# Patient Record
Sex: MALE | Race: WHITE | Employment: UNEMPLOYED | URBAN - METROPOLITAN AREA
[De-identification: names, ages, dates, MRNs, and addresses within clinical notes are randomized per-mention and may not be internally consistent; named-entity substitution may affect disease eponyms.]

---

## 2019-01-15 ENCOUNTER — TELEPHONE (OUTPATIENT)
Dept: FAMILY MEDICINE CLINIC | Facility: CLINIC | Age: 49
End: 2019-01-15

## 2019-01-16 ENCOUNTER — OFFICE VISIT (OUTPATIENT)
Dept: FAMILY MEDICINE CLINIC | Facility: CLINIC | Age: 49
End: 2019-01-16
Payer: COMMERCIAL

## 2019-01-16 VITALS
DIASTOLIC BLOOD PRESSURE: 70 MMHG | SYSTOLIC BLOOD PRESSURE: 128 MMHG | HEIGHT: 69.5 IN | RESPIRATION RATE: 16 BRPM | WEIGHT: 210 LBS | TEMPERATURE: 99 F | BODY MASS INDEX: 30.4 KG/M2 | HEART RATE: 84 BPM

## 2019-01-16 DIAGNOSIS — Z00.00 ANNUAL PHYSICAL EXAM: Primary | ICD-10-CM

## 2019-01-16 DIAGNOSIS — B07.9 VIRAL WARTS, UNSPECIFIED TYPE: ICD-10-CM

## 2019-01-16 DIAGNOSIS — Z12.11 COLON CANCER SCREENING: ICD-10-CM

## 2019-01-16 PROCEDURE — 99386 PREV VISIT NEW AGE 40-64: CPT | Performed by: FAMILY MEDICINE

## 2019-01-16 NOTE — PROGRESS NOTES
Patient presents with:  Physical: new patient first visit, no labs, not fasting, no chronic health hx, no rx     HPI:   Jackie Johnson is a 51year old male who presents for a complete physical exam. He is a new patient.       Sinus - his wife says he s °C) (Oral)   Resp 16   Ht 69.5\"   Wt 210 lb   BMI 30.57 kg/m²   Body mass index is 30.57 kg/m². General appearance: alert, appears stated age and cooperative  Eyes: conjunctivae/corneas clear. PERRL, EOM's intact.    Ears: normal TM's and external ear

## 2019-10-09 ENCOUNTER — OFFICE VISIT (OUTPATIENT)
Dept: FAMILY MEDICINE CLINIC | Facility: CLINIC | Age: 49
End: 2019-10-09
Payer: COMMERCIAL

## 2019-10-09 VITALS
WEIGHT: 211.81 LBS | HEART RATE: 89 BPM | HEIGHT: 69.5 IN | BODY MASS INDEX: 30.67 KG/M2 | TEMPERATURE: 99 F | DIASTOLIC BLOOD PRESSURE: 74 MMHG | RESPIRATION RATE: 18 BRPM | SYSTOLIC BLOOD PRESSURE: 122 MMHG

## 2019-10-09 DIAGNOSIS — J01.00 ACUTE NON-RECURRENT MAXILLARY SINUSITIS: Primary | ICD-10-CM

## 2019-10-09 PROCEDURE — 99213 OFFICE O/P EST LOW 20 MIN: CPT | Performed by: NURSE PRACTITIONER

## 2019-10-09 RX ORDER — AMOXICILLIN AND CLAVULANATE POTASSIUM 875; 125 MG/1; MG/1
1 TABLET, FILM COATED ORAL 2 TIMES DAILY
Qty: 20 TABLET | Refills: 0 | Status: SHIPPED | OUTPATIENT
Start: 2019-10-09 | End: 2019-10-19

## 2019-10-09 NOTE — PROGRESS NOTES
Patient presents with:  Sinus Problem: X 2 WEEK, PRESSURE , YELLOW MUCUS, SUDAFED AM , BENADRYL PM      HPI:  Presents with 2 week history of sinus congestion, mild sore throat, cough with production of yellow colored sputum, fatigue, nasal drainage and ea respiratory distress. Effort normal. Breath sounds clear bilaterally. No wheezes, rhonchi or rales appreciated. No cough heard during exam.    Skin: Skin is warm and dry. No rash noted. No erythema. No pallor.        A/P:    Acute non-recurrent maxillary si variety over the \"teapot\" style, as it allows for better cleansing.      If you use the full sinus rinse device, as above, you may also still use the Ocean Nasal spray (or generic equivalent) during the day while at work, school or in between full sinus w

## 2019-10-09 NOTE — PATIENT INSTRUCTIONS
Gargle with warm salt water solution 3-5 times daily. Dissolve 1/2 teaspoon salt in half cup of warm tap water. Gargle and spit.      Try a premixed saline nasal spray, available over the counter, such as Smith Nasal Spray (or generic equi

## 2020-03-11 ENCOUNTER — OFFICE VISIT (OUTPATIENT)
Dept: FAMILY MEDICINE CLINIC | Facility: CLINIC | Age: 50
End: 2020-03-11
Payer: COMMERCIAL

## 2020-03-11 VITALS
TEMPERATURE: 99 F | HEART RATE: 80 BPM | BODY MASS INDEX: 30.55 KG/M2 | SYSTOLIC BLOOD PRESSURE: 122 MMHG | DIASTOLIC BLOOD PRESSURE: 76 MMHG | RESPIRATION RATE: 18 BRPM | HEIGHT: 69.5 IN | WEIGHT: 211 LBS

## 2020-03-11 DIAGNOSIS — J30.2 SEASONAL ALLERGIC RHINITIS, UNSPECIFIED TRIGGER: ICD-10-CM

## 2020-03-11 DIAGNOSIS — R09.81 SINUS CONGESTION: Primary | ICD-10-CM

## 2020-03-11 PROCEDURE — 99213 OFFICE O/P EST LOW 20 MIN: CPT | Performed by: NURSE PRACTITIONER

## 2020-03-11 RX ORDER — PREDNISONE 20 MG/1
40 TABLET ORAL DAILY
Qty: 10 TABLET | Refills: 0 | Status: SHIPPED | OUTPATIENT
Start: 2020-03-11 | End: 2020-09-24 | Stop reason: ALTCHOICE

## 2020-03-11 RX ORDER — FLUTICASONE PROPIONATE 50 MCG
1 SPRAY, SUSPENSION (ML) NASAL 2 TIMES DAILY
Qty: 1 BOTTLE | Refills: 0 | Status: SHIPPED | OUTPATIENT
Start: 2020-03-11

## 2020-03-11 NOTE — PROGRESS NOTES
Patient presents with:  Sinus Problem: x 3-4 weeks, stuffed up, yellow mucus      HPI:  Presents with approx 3-4 week history of sinus congestion, occasional cough with production of yellow colored sputum and some occasional nasal drainage.  Denies fever/ch Normal rate, regular rhythm. No murmur. Pulmonary/Chest: No respiratory distress. Effort normal. Breath sounds clear bilaterally. No wheezes, rhonchi or rales appreciated. No cough heard during exam.    Skin: Skin is warm and dry. No rash noted.  No eryt

## 2020-03-11 NOTE — PATIENT INSTRUCTIONS
Try a premixed saline nasal spray, available over the counter, such as [place] Nasal Spray (or generic equivalent), 4 times daily (may use up to every 4 hours if desired). Do one spray each nostril and gently blow nose after.

## 2020-09-18 ENCOUNTER — TELEPHONE (OUTPATIENT)
Dept: FAMILY MEDICINE CLINIC | Facility: CLINIC | Age: 50
End: 2020-09-18

## 2020-09-18 DIAGNOSIS — Z13.220 SCREENING FOR LIPID DISORDERS: ICD-10-CM

## 2020-09-18 DIAGNOSIS — Z12.5 SCREENING FOR PROSTATE CANCER: ICD-10-CM

## 2020-09-18 DIAGNOSIS — Z13.228 SCREENING FOR METABOLIC DISORDER: ICD-10-CM

## 2020-09-18 DIAGNOSIS — Z13.0 SCREENING FOR BLOOD DISEASE: Primary | ICD-10-CM

## 2020-09-18 DIAGNOSIS — Z13.29 SCREENING FOR THYROID DISORDER: ICD-10-CM

## 2020-09-18 NOTE — TELEPHONE ENCOUNTER
Please enter lab orders for the patient's upcoming physical appointment. Physical scheduled:    Your appointments     Date & Time Appointment Department [facility])    Sep 24, 2020  9:30 AM CDT Adult Physical with Issa Linn  [place], [place]

## 2020-09-24 ENCOUNTER — OFFICE VISIT (OUTPATIENT)
Dept: FAMILY MEDICINE CLINIC | Facility: CLINIC | Age: 50
End: 2020-09-24
Payer: COMMERCIAL

## 2020-09-24 VITALS
BODY MASS INDEX: 29.28 KG/M2 | RESPIRATION RATE: 18 BRPM | DIASTOLIC BLOOD PRESSURE: 60 MMHG | SYSTOLIC BLOOD PRESSURE: 108 MMHG | HEART RATE: 76 BPM | HEIGHT: 69.69 IN | WEIGHT: 202.25 LBS

## 2020-09-24 DIAGNOSIS — Z23 NEED FOR INFLUENZA VACCINATION: ICD-10-CM

## 2020-09-24 DIAGNOSIS — Z12.11 SCREENING FOR COLON CANCER: ICD-10-CM

## 2020-09-24 DIAGNOSIS — M79.671 PAIN OF RIGHT HEEL: ICD-10-CM

## 2020-09-24 DIAGNOSIS — Z00.00 ROUTINE PHYSICAL EXAMINATION: Primary | ICD-10-CM

## 2020-09-24 PROCEDURE — 3078F DIAST BP <80 MM HG: CPT | Performed by: NURSE PRACTITIONER

## 2020-09-24 PROCEDURE — 90686 IIV4 VACC NO PRSV 0.5 ML IM: CPT | Performed by: NURSE PRACTITIONER

## 2020-09-24 PROCEDURE — 3008F BODY MASS INDEX DOCD: CPT | Performed by: NURSE PRACTITIONER

## 2020-09-24 PROCEDURE — 99396 PREV VISIT EST AGE 40-64: CPT | Performed by: NURSE PRACTITIONER

## 2020-09-24 PROCEDURE — 90471 IMMUNIZATION ADMIN: CPT | Performed by: NURSE PRACTITIONER

## 2020-09-24 PROCEDURE — 3074F SYST BP LT 130 MM HG: CPT | Performed by: NURSE PRACTITIONER

## 2020-09-24 NOTE — PROGRESS NOTES
Jailene Alaniz is a 45year old male who presents for a complete physical exam.     HPI:   Pt complains of 4-5 day history of right heel pain. Constant dull ache, worse with walking. Denies known injury. Denies redness or swelling of heel.  Has been nic 2009        Years since quittin.6      Smokeless tobacco: Never Used    Alcohol use: Yes      Comment: occasionally    Drug use: No     Social History: as above     Health Maintenance  Immunizations: Recommend flu vaccine for 9475-6317 flu season to auscultation bilaterally, effort normal. Symmetrical expansion during respirations. CARDIO: RRR without murmurs. No S3/S4. GI: Soft, non-tender/non-distended, BS(+)x4, no masses, HSM or CVA tenderness.   : Bilat descended testes are smooth, non-tend prescriptions requested or ordered in this encounter       Imaging & Consults:  FLULAVAL INFLUENZA VACCINE QUAD PRESERVATIVE FREE 0.5 ML  GASTRO - INTERNAL    No follow-ups on file.   Patient Instructions                         Call [phone] to nandini

## 2020-09-28 ENCOUNTER — LAB ENCOUNTER (OUTPATIENT)
Dept: LAB | Age: 50
End: 2020-09-28
Attending: FAMILY MEDICINE
Payer: COMMERCIAL

## 2020-09-28 DIAGNOSIS — Z13.220 SCREENING FOR LIPID DISORDERS: ICD-10-CM

## 2020-09-28 DIAGNOSIS — Z12.5 SCREENING FOR PROSTATE CANCER: ICD-10-CM

## 2020-09-28 DIAGNOSIS — Z13.228 SCREENING FOR METABOLIC DISORDER: ICD-10-CM

## 2020-09-28 DIAGNOSIS — Z13.29 SCREENING FOR THYROID DISORDER: ICD-10-CM

## 2020-09-28 DIAGNOSIS — Z13.0 SCREENING FOR BLOOD DISEASE: ICD-10-CM

## 2020-09-28 LAB
ALBUMIN SERPL-MCNC: 3.8 G/DL (ref 3.4–5)
ALBUMIN/GLOB SERPL: 1.1 {RATIO} (ref 1–2)
ALP LIVER SERPL-CCNC: 62 U/L
ALT SERPL-CCNC: 24 U/L
ANION GAP SERPL CALC-SCNC: 3 MMOL/L (ref 0–18)
AST SERPL-CCNC: 20 U/L (ref 15–37)
BASOPHILS # BLD AUTO: 0.07 X10(3) UL (ref 0–0.2)
BASOPHILS NFR BLD AUTO: 1.3 %
BILIRUB SERPL-MCNC: 0.4 MG/DL (ref 0.1–2)
BUN BLD-MCNC: 13 MG/DL (ref 7–18)
BUN/CREAT SERPL: 10.7 (ref 10–20)
CALCIUM BLD-MCNC: 8.9 MG/DL (ref 8.5–10.1)
CHLORIDE SERPL-SCNC: 108 MMOL/L (ref 98–112)
CHOLEST SMN-MCNC: 219 MG/DL (ref ?–200)
CO2 SERPL-SCNC: 27 MMOL/L (ref 21–32)
COMPLEXED PSA SERPL-MCNC: 0.98 NG/ML (ref ?–4)
CREAT BLD-MCNC: 1.21 MG/DL
DEPRECATED RDW RBC AUTO: 46.5 FL (ref 35.1–46.3)
EOSINOPHIL # BLD AUTO: 0.48 X10(3) UL (ref 0–0.7)
EOSINOPHIL NFR BLD AUTO: 9.1 %
ERYTHROCYTE [DISTWIDTH] IN BLOOD BY AUTOMATED COUNT: 13.1 % (ref 11–15)
GLOBULIN PLAS-MCNC: 3.6 G/DL (ref 2.8–4.4)
GLUCOSE BLD-MCNC: 100 MG/DL (ref 70–99)
HCT VFR BLD AUTO: 45.7 %
HDLC SERPL-MCNC: 60 MG/DL (ref 40–59)
HGB BLD-MCNC: 14.8 G/DL
IMM GRANULOCYTES # BLD AUTO: 0.02 X10(3) UL (ref 0–1)
IMM GRANULOCYTES NFR BLD: 0.4 %
LDLC SERPL CALC-MCNC: 133 MG/DL (ref ?–100)
LYMPHOCYTES # BLD AUTO: 1.47 X10(3) UL (ref 1–4)
LYMPHOCYTES NFR BLD AUTO: 27.7 %
M PROTEIN MFR SERPL ELPH: 7.4 G/DL (ref 6.4–8.2)
MCH RBC QN AUTO: 31 PG (ref 26–34)
MCHC RBC AUTO-ENTMCNC: 32.4 G/DL (ref 31–37)
MCV RBC AUTO: 95.8 FL
MONOCYTES # BLD AUTO: 0.6 X10(3) UL (ref 0.1–1)
MONOCYTES NFR BLD AUTO: 11.3 %
NEUTROPHILS # BLD AUTO: 2.66 X10 (3) UL (ref 1.5–7.7)
NEUTROPHILS # BLD AUTO: 2.66 X10(3) UL (ref 1.5–7.7)
NEUTROPHILS NFR BLD AUTO: 50.2 %
NONHDLC SERPL-MCNC: 159 MG/DL (ref ?–130)
OSMOLALITY SERPL CALC.SUM OF ELEC: 286 MOSM/KG (ref 275–295)
PATIENT FASTING Y/N/NP: YES
PATIENT FASTING Y/N/NP: YES
PLATELET # BLD AUTO: 183 10(3)UL (ref 150–450)
POTASSIUM SERPL-SCNC: 4.7 MMOL/L (ref 3.5–5.1)
RBC # BLD AUTO: 4.77 X10(6)UL
SODIUM SERPL-SCNC: 138 MMOL/L (ref 136–145)
TRIGL SERPL-MCNC: 129 MG/DL (ref 30–149)
TSI SER-ACNC: 0.91 MIU/ML (ref 0.36–3.74)
VLDLC SERPL CALC-MCNC: 26 MG/DL (ref 0–30)
WBC # BLD AUTO: 5.3 X10(3) UL (ref 4–11)

## 2020-09-28 PROCEDURE — 80061 LIPID PANEL: CPT | Performed by: NURSE PRACTITIONER

## 2020-09-28 PROCEDURE — 36415 COLL VENOUS BLD VENIPUNCTURE: CPT | Performed by: NURSE PRACTITIONER

## 2020-09-28 PROCEDURE — 84153 ASSAY OF PSA TOTAL: CPT | Performed by: NURSE PRACTITIONER

## 2020-09-28 PROCEDURE — 80050 GENERAL HEALTH PANEL: CPT | Performed by: NURSE PRACTITIONER

## 2020-09-30 ENCOUNTER — PATIENT MESSAGE (OUTPATIENT)
Dept: FAMILY MEDICINE CLINIC | Facility: CLINIC | Age: 50
End: 2020-09-30

## 2020-09-30 NOTE — PROGRESS NOTES
Wife,Courtney  notified of results and recommendations. She verbalized understanding and agrees with plan.

## 2020-09-30 NOTE — TELEPHONE ENCOUNTER
Chart reviewed and there is no stool results in Pts chart- results are in proper chart- results are in [facility] chart.   Spoke with wife and she will review with

## 2020-09-30 NOTE — TELEPHONE ENCOUNTER
From: Shante Macedo  To: Yas Anderson NP  Sent: 9/30/2020 10:15 AM CDT  Subject: Test Results Question    Dr Francisca Lopez, I dropped off a sample of checking for blood in stool for my wife(Elen Chowdhury) while I was at my blood draw, but the results

## 2021-04-07 ENCOUNTER — LAB ENCOUNTER (OUTPATIENT)
Dept: LAB | Facility: HOSPITAL | Age: 51
End: 2021-04-07
Attending: NURSE PRACTITIONER
Payer: MEDICAID

## 2021-04-07 ENCOUNTER — TELEMEDICINE (OUTPATIENT)
Dept: FAMILY MEDICINE CLINIC | Facility: CLINIC | Age: 51
End: 2021-04-07

## 2021-04-07 DIAGNOSIS — Z20.822 EXPOSURE TO COVID-19 VIRUS: ICD-10-CM

## 2021-04-07 DIAGNOSIS — R09.81 SINUS CONGESTION: ICD-10-CM

## 2021-04-07 DIAGNOSIS — R09.81 SINUS CONGESTION: Primary | ICD-10-CM

## 2021-04-07 DIAGNOSIS — Z30.09 VASECTOMY EVALUATION: ICD-10-CM

## 2021-04-07 PROCEDURE — 99213 OFFICE O/P EST LOW 20 MIN: CPT | Performed by: NURSE PRACTITIONER

## 2021-04-07 NOTE — PROGRESS NOTES
Patient presents with:  Covid: would like covid test had post nasal drip, itchy eyes and nose, a little congested       This visit was conducted using Telemedicine with live, interactive video and audio.     Patient understands and accepts financial respons diagnosis)- discussed symptoms seem like allergies but are consistent enough with COVID infection and given exposure we should do testing, testing ordered. Testing scheduling/procedure reviewed with patient.  Discussed should consider himself COVID (+) unti Please call [phone] to schedule COVID-19 testing. If you are positive for COVID, you should purchase a pulse oximeter, available at many local stores and on [employer]  This is a small tool that goes on your finger to check your ox

## 2021-04-07 NOTE — PATIENT INSTRUCTIONS
Please call [phone] to schedule COVID-19 testing. If you are positive for COVID, you should purchase a pulse oximeter, available at many local stores and on [employer]  This is a small tool that goes on your finger to check your oxyg

## 2021-04-28 ENCOUNTER — TELEMEDICINE (OUTPATIENT)
Dept: FAMILY MEDICINE CLINIC | Facility: CLINIC | Age: 51
End: 2021-04-28

## 2021-04-28 DIAGNOSIS — G89.29 CHRONIC PAIN OF BOTH KNEES: ICD-10-CM

## 2021-04-28 DIAGNOSIS — M25.562 CHRONIC PAIN OF BOTH KNEES: ICD-10-CM

## 2021-04-28 DIAGNOSIS — I83.813 VARICOSE VEINS OF BOTH LOWER EXTREMITIES WITH PAIN: ICD-10-CM

## 2021-04-28 DIAGNOSIS — M25.561 CHRONIC PAIN OF BOTH KNEES: ICD-10-CM

## 2021-04-28 DIAGNOSIS — J30.89 ENVIRONMENTAL AND SEASONAL ALLERGIES: Primary | ICD-10-CM

## 2021-04-28 PROCEDURE — 99213 OFFICE O/P EST LOW 20 MIN: CPT | Performed by: NURSE PRACTITIONER

## 2021-04-28 RX ORDER — MONTELUKAST SODIUM 10 MG/1
10 TABLET ORAL NIGHTLY
Qty: 30 TABLET | Refills: 1 | Status: SHIPPED | OUTPATIENT
Start: 2021-04-28 | End: 2021-07-01

## 2021-04-28 RX ORDER — LEVOCETIRIZINE DIHYDROCHLORIDE 5 MG/1
5 TABLET, FILM COATED ORAL EVERY EVENING
COMMUNITY
End: 2021-07-01

## 2021-04-28 NOTE — PROGRESS NOTES
Patient presents with:  Referral: to allergist and would like to see someone for knees veins       This visit was conducted using Telemedicine with live, interactive video and audio.     Patient understands and accepts financial responsibility for any deduc mouth nightly. 30 tablet 1   • Fluticasone Propionate 50 MCG/ACT Nasal Suspension 1 spray by Each Nare route 2 (two) times daily. 1 Bottle 0       Physical Exam  General:  Sitting calmly on screen, non-toxic appearing, no apparent distress.       Neuro: AOx total) by mouth nightly. Imaging & Consults:  ALLERGY - INTERNAL  SURGERY - INTERNAL  US VENOUS INSUFFICIENCY (REFLUX) BILAT LOWER EXT SH(CPT=93970)    No follow-ups on file. There are no Patient Instructions on file for this visit.     All questions

## 2021-05-11 NOTE — H&P
HPI:     Yadira Mcguire is a 45year old male with a PMH of HL, kidney stone who presents as a consult from [facility] office to discuss vasectomy. Number of children: 3    He desires permanent sterility via bilateral vasectomy.  He understands th Medications (Active prior to today's visit):  Current Outpatient Medications   Medication Sig Dispense Refill   • diazepam (VALIUM) 10 MG Oral Tab Take 1 tablet (10 mg total) by mouth See Admin Instructions.  Take 1-2 tablets by mouth 20-30 minutes befo

## 2021-05-13 ENCOUNTER — HOSPITAL ENCOUNTER (OUTPATIENT)
Dept: ULTRASOUND IMAGING | Age: 51
Discharge: HOME | End: 2021-05-13
Attending: NURSE PRACTITIONER
Payer: MEDICAID

## 2021-05-13 DIAGNOSIS — I83.813 VARICOSE VEINS OF BOTH LOWER EXTREMITIES WITH PAIN: ICD-10-CM

## 2021-05-13 PROCEDURE — 93970 EXTREMITY STUDY: CPT | Performed by: NURSE PRACTITIONER

## 2021-05-17 ENCOUNTER — OFFICE VISIT (OUTPATIENT)
Dept: SURGERY | Facility: CLINIC | Age: 51
End: 2021-05-17
Payer: MEDICAID

## 2021-05-17 VITALS
WEIGHT: 195 LBS | TEMPERATURE: 98 F | BODY MASS INDEX: 28.23 KG/M2 | HEART RATE: 80 BPM | SYSTOLIC BLOOD PRESSURE: 120 MMHG | HEIGHT: 69.5 IN | DIASTOLIC BLOOD PRESSURE: 75 MMHG

## 2021-05-17 VITALS — TEMPERATURE: 97 F | SYSTOLIC BLOOD PRESSURE: 118 MMHG | DIASTOLIC BLOOD PRESSURE: 77 MMHG | HEART RATE: 71 BPM

## 2021-05-17 DIAGNOSIS — I83.813 VARICOSE VEINS OF BILATERAL LOWER EXTREMITIES WITH PAIN: Primary | ICD-10-CM

## 2021-05-17 DIAGNOSIS — Z30.2 ENCOUNTER FOR STERILIZATION: Primary | ICD-10-CM

## 2021-05-17 PROCEDURE — 3074F SYST BP LT 130 MM HG: CPT | Performed by: UROLOGY

## 2021-05-17 PROCEDURE — 99243 OFF/OP CNSLTJ NEW/EST LOW 30: CPT | Performed by: SURGERY

## 2021-05-17 PROCEDURE — 3074F SYST BP LT 130 MM HG: CPT | Performed by: SURGERY

## 2021-05-17 PROCEDURE — 3008F BODY MASS INDEX DOCD: CPT | Performed by: SURGERY

## 2021-05-17 PROCEDURE — 3078F DIAST BP <80 MM HG: CPT | Performed by: UROLOGY

## 2021-05-17 PROCEDURE — 99243 OFF/OP CNSLTJ NEW/EST LOW 30: CPT | Performed by: UROLOGY

## 2021-05-17 PROCEDURE — 3078F DIAST BP <80 MM HG: CPT | Performed by: SURGERY

## 2021-05-17 RX ORDER — TRAMADOL HYDROCHLORIDE 50 MG/1
50 TABLET ORAL EVERY 6 HOURS PRN
Qty: 15 TABLET | Refills: 0 | Status: SHIPPED | OUTPATIENT
Start: 2021-05-17 | End: 2021-10-06

## 2021-05-17 RX ORDER — DIAZEPAM 10 MG/1
10 TABLET ORAL SEE ADMIN INSTRUCTIONS
Qty: 2 TABLET | Refills: 0 | Status: SHIPPED | OUTPATIENT
Start: 2021-05-17 | End: 2021-10-06

## 2021-05-17 NOTE — H&P
New Patient Visit Note       Active Problems      1. Varicose veins of bilateral lower extremities with pain        Chief Complaint   Patient presents with:  Varicose Veins: NP varicose veins- PT states has had varicose veins for years.  PT states are swoll Problems Father    • Cancer Mother         uterine   • Ovarian Cancer Mother         passed away   • No Known Problems Daughter    • No Known Problems Son    • Asthma Brother    • Other (Other) Son         autism     Social History    Socioeconomic History breath and wheezing. Cardiovascular: Negative for chest pain, palpitations and leg swelling. Gastrointestinal: Negative for abdominal distention, abdominal pain, anal bleeding, blood in stool, constipation, diarrhea, nausea and vomiting.    Sharifa Alfred second varicose vein running along the posterior knee. These veins measure approximately 5 mm in diameter. Neurological:      Mental Status: He is alert and oriented to person, place, and time.    Psychiatric:         Speech: Speech normal.         Behav Right: 6 mm  Left: 5 mm   Proximal Thigh:    Right: 4 mm  Left: 4 mm   Mid Thigh:            Right: 5 mm  Left: 3 mm   Distal Thigh:        Right: 6 mm  Left: 3 mm   Knee:              Right: 4 mm  Left: 3 mm   Prox Calf:         Right: 3 mm  Left: 2 mm reflux for a brief time.           Dictated by (CST): Meli Chandler MD on 5/13/2021       Assessment   Varicose veins of bilateral lower extremities with pain  (primary encounter diagnosis)      Plan   · The patient does have evidence of bilateral great

## 2021-05-27 ENCOUNTER — TELEPHONE (OUTPATIENT)
Dept: SURGERY | Facility: CLINIC | Age: 51
End: 2021-05-27

## 2021-06-07 ENCOUNTER — TELEPHONE (OUTPATIENT)
Dept: FAMILY MEDICINE CLINIC | Facility: CLINIC | Age: 51
End: 2021-06-07

## 2021-06-07 NOTE — TELEPHONE ENCOUNTER
The allergist may want to order it themselves so they do not have to try and get records from us. Also, the allergist may have specific tests they want done. It is ok to wait and do testing once seen by allergist. Thanks.

## 2021-06-07 NOTE — TELEPHONE ENCOUNTER
Pt is calling he was told by Kacey Saldana NP to have blood work done prior to meeting the allergist for his allergy testing and when he called to make the appt with the allergist they told him not to have any blood work done. He said he is confused.  Please

## 2021-06-30 NOTE — PROGRESS NOTES
HPI:     Dudley Moritz is a 45year old male with a PMH of HL, kidney stone. Following as a consult from [facility] [place] office for vasectomy. Number of children: 3    He presents today for office vasectomy. Procedure performed without issue.  I rem until we have obtained a negative semen analysis per office protocol explained in post-operative instructions.      The patient tolerated the procedure well and left in satisfactory condition without complaints & reminded to wear his athletic supporter for 12.4      Smokeless tobacco: Never Used    Vaping Use      Vaping Use: Never used    Alcohol use: Yes      Comment: occasionally    Drug use: No       Medications (Active prior to today's visit):  Current Outpatient Medications   Medication Sig Dispense Re

## 2021-07-06 ENCOUNTER — TELEPHONE (OUTPATIENT)
Dept: SURGERY | Facility: CLINIC | Age: 51
End: 2021-07-06

## 2021-07-06 NOTE — TELEPHONE ENCOUNTER
I called the pt and discussed rec to plan for at least 1 hour for his vasectomy appt. Pt verbalized understanding and all questions were answered.

## 2021-07-06 NOTE — TELEPHONE ENCOUNTER
Per pt is scheduled for a vasectomy and is asking about how long the procedure will be so he can arrange for transportation  after.   Please advise

## 2021-07-09 ENCOUNTER — PROCEDURE (OUTPATIENT)
Dept: SURGERY | Facility: CLINIC | Age: 51
End: 2021-07-09
Payer: MEDICAID

## 2021-07-09 VITALS — SYSTOLIC BLOOD PRESSURE: 113 MMHG | DIASTOLIC BLOOD PRESSURE: 75 MMHG | HEART RATE: 68 BPM | TEMPERATURE: 98 F

## 2021-07-09 DIAGNOSIS — Z30.2 ENCOUNTER FOR STERILIZATION: Primary | ICD-10-CM

## 2021-07-09 PROCEDURE — 3078F DIAST BP <80 MM HG: CPT | Performed by: UROLOGY

## 2021-07-09 PROCEDURE — 3074F SYST BP LT 130 MM HG: CPT | Performed by: UROLOGY

## 2021-07-09 PROCEDURE — 55250 REMOVAL OF SPERM DUCT(S): CPT | Performed by: UROLOGY

## 2021-08-02 ENCOUNTER — OFFICE VISIT (OUTPATIENT)
Dept: SURGERY | Facility: CLINIC | Age: 51
End: 2021-08-02
Payer: MEDICAID

## 2021-08-02 VITALS
SYSTOLIC BLOOD PRESSURE: 121 MMHG | HEART RATE: 69 BPM | TEMPERATURE: 98 F | WEIGHT: 202 LBS | DIASTOLIC BLOOD PRESSURE: 78 MMHG | BODY MASS INDEX: 29.25 KG/M2 | HEIGHT: 69.5 IN

## 2021-08-02 DIAGNOSIS — I83.813 VARICOSE VEINS OF BILATERAL LOWER EXTREMITIES WITH PAIN: Primary | ICD-10-CM

## 2021-08-02 PROCEDURE — 99213 OFFICE O/P EST LOW 20 MIN: CPT | Performed by: SURGERY

## 2021-08-02 PROCEDURE — 3074F SYST BP LT 130 MM HG: CPT | Performed by: SURGERY

## 2021-08-02 PROCEDURE — 3078F DIAST BP <80 MM HG: CPT | Performed by: SURGERY

## 2021-08-02 PROCEDURE — 3008F BODY MASS INDEX DOCD: CPT | Performed by: SURGERY

## 2021-08-02 NOTE — PROGRESS NOTES
Phoned in Valium 5mg tablet to take half hour prior to each procedure at [facility]. Dispense 2 tablets and no refills per Dr. Sunshine Márquez.

## 2021-08-30 ENCOUNTER — TELEPHONE (OUTPATIENT)
Dept: SURGERY | Facility: CLINIC | Age: 51
End: 2021-08-30

## 2021-08-30 NOTE — TELEPHONE ENCOUNTER
[facility] approved bilateral radiofrequency ablation of greater saphenous vein. Predetermination approval letters to scanning.

## 2021-10-06 ENCOUNTER — OFFICE VISIT (OUTPATIENT)
Dept: SURGERY | Facility: CLINIC | Age: 51
End: 2021-10-06
Payer: MEDICAID

## 2021-10-06 VITALS — SYSTOLIC BLOOD PRESSURE: 144 MMHG | TEMPERATURE: 97 F | DIASTOLIC BLOOD PRESSURE: 81 MMHG | HEART RATE: 73 BPM

## 2021-10-06 DIAGNOSIS — I87.2 VENOUS INSUFFICIENCY OF RIGHT LEG: Primary | ICD-10-CM

## 2021-10-06 DIAGNOSIS — Z98.890 STATUS POST ENDOVENOUS RADIOFREQUENCY ABLATION (RFA) OF SAPHENOUS VEIN: ICD-10-CM

## 2021-10-06 PROCEDURE — 36475 ENDOVENOUS RF 1ST VEIN: CPT | Performed by: SURGERY

## 2021-10-06 PROCEDURE — 3079F DIAST BP 80-89 MM HG: CPT | Performed by: SURGERY

## 2021-10-06 PROCEDURE — 3077F SYST BP >= 140 MM HG: CPT | Performed by: SURGERY

## 2021-10-06 NOTE — PROGRESS NOTES
Follow Up Visit Note       Active Problems      1. Venous insufficiency of right leg    2.  Status post endovenous radiofrequency ablation (RFA) of saphenous vein          Chief Complaint   Patient presents with:  Varicose Veins: Est Pt radiofrequency ablat Sexual Activity      Alcohol use: Yes        Comment: occasionally      Drug use: No    Other Topics      Concerns:        Caffeine Concern: Yes          1 cup a day         Exercise: Yes        Seat Belt: Yes       Current Outpatient Medications:   •  cet Content:  Thought content normal.         Judgment: Judgment normal.          Assessment   Venous insufficiency of right leg  (primary encounter diagnosis)  Status post endovenous radiofrequency ablation (RFA) of saphenous vein    Plan   · We will proceed w

## 2021-10-13 ENCOUNTER — LAB ENCOUNTER (OUTPATIENT)
Dept: LAB | Facility: HOSPITAL | Age: 51
End: 2021-10-13
Attending: UROLOGY
Payer: MEDICAID

## 2021-10-13 DIAGNOSIS — Z30.2 ENCOUNTER FOR STERILIZATION: ICD-10-CM

## 2021-10-13 PROCEDURE — 89310 SEMEN ANALYSIS W/COUNT: CPT

## 2021-10-13 NOTE — PROGRESS NOTES
Thomas Bragg,  I have reviewed your test results. Your semen analysis shows no sperm. You may now resume intercourse without the use of contraception. Please let me know if you have any questions or concerns. Thanks and take care!     Georgina Puente MD

## 2021-10-20 ENCOUNTER — OFFICE VISIT (OUTPATIENT)
Dept: SURGERY | Facility: CLINIC | Age: 51
End: 2021-10-20
Payer: MEDICAID

## 2021-10-20 DIAGNOSIS — Z98.890 STATUS POST ENDOVENOUS RADIOFREQUENCY ABLATION (RFA) OF SAPHENOUS VEIN: ICD-10-CM

## 2021-10-20 DIAGNOSIS — I83.813 VARICOSE VEINS OF BILATERAL LOWER EXTREMITIES WITH PAIN: ICD-10-CM

## 2021-10-20 DIAGNOSIS — I87.2 VENOUS INSUFFICIENCY OF LEFT LEG: Primary | ICD-10-CM

## 2021-10-20 PROCEDURE — 36475 ENDOVENOUS RF 1ST VEIN: CPT | Performed by: SURGERY

## 2021-10-20 NOTE — PROGRESS NOTES
Follow Up Visit Note       Active Problems      1. Venous insufficiency of left leg    2.  Varicose veins of bilateral lower extremities with pain          Chief Complaint   Patient presents with:  Varicose Veins: Est Pt 10/20 radiofrequency ablation of lef Topics      Concerns:        Caffeine Concern: Yes          1 cup a day         Exercise: Yes        Seat Belt: Yes       Current Outpatient Medications:   •  cetirizine 10 MG Oral Tab, Take 10 mg by mouth daily. , Disp: , Rfl:   •  Fluticasone Propionate 5 insufficiency of left leg  (primary encounter diagnosis)  Varicose veins of bilateral lower extremities with pain    Plan   · We will proceed with radiofrequency ablation of his left greater saphenous vein. · He will follow up next week for ultrasound.

## 2021-10-20 NOTE — PROCEDURES
Pre-op Diagnosis: Left greater saphenous vein insufficiency    Post-op Diagnosis: Left greater saphenous vein insufficiency    Procedure: Endovenous radiofrequency ablation of the left greater saphenous vein    Surgeon: Marisela Foy MD    Anesthesia endovascular ablation catheter was then passed onto the field. Using sterile technique, the catheter was placed into the sheath and moved into position for radiofrequency ablation.   The proximal position of the radiofrequency probe was verified by [place] patient is to follow up in approximately 7-10 days to go to undergo follow up ultrasound of the lower extremity. The patient will followup with me within the next 2-3 weeks to assess healing.     Jah Nino MD

## 2021-10-27 ENCOUNTER — NURSE ONLY (OUTPATIENT)
Dept: SURGERY | Facility: CLINIC | Age: 51
End: 2021-10-27
Payer: MEDICAID

## 2021-11-03 ENCOUNTER — TELEMEDICINE (OUTPATIENT)
Dept: FAMILY MEDICINE CLINIC | Facility: CLINIC | Age: 51
End: 2021-11-03

## 2021-11-03 DIAGNOSIS — J32.9 CHRONIC CONGESTION OF PARANASAL SINUS: ICD-10-CM

## 2021-11-03 DIAGNOSIS — J01.90 ACUTE NON-RECURRENT SINUSITIS, UNSPECIFIED LOCATION: Primary | ICD-10-CM

## 2021-11-03 PROCEDURE — 99213 OFFICE O/P EST LOW 20 MIN: CPT | Performed by: NURSE PRACTITIONER

## 2021-11-03 RX ORDER — AMOXICILLIN AND CLAVULANATE POTASSIUM 875; 125 MG/1; MG/1
1 TABLET, FILM COATED ORAL 2 TIMES DAILY
Qty: 14 TABLET | Refills: 0 | Status: SHIPPED | OUTPATIENT
Start: 2021-11-03 | End: 2021-11-10

## 2021-11-03 NOTE — PROGRESS NOTES
Patient presents with:  Nasal Congestion: x 1 week, yellow mucus      This visit was conducted using Telemedicine with live, two-way interactive video and audio.     Patient understands and accepts financial responsibility for any deductible, co-insurance a need to pause speaking to take breaths, no deep/exaggerated breaths or coughing noted during conversation.        Skin: warm and pink w/o flushing or obvious diaphoresis     Psych: pleasant and cooperative on video, speech pattern normal.     A/P:    Acute

## 2021-11-08 ENCOUNTER — OFFICE VISIT (OUTPATIENT)
Dept: SURGERY | Facility: CLINIC | Age: 51
End: 2021-11-08
Payer: MEDICAID

## 2021-11-08 VITALS
WEIGHT: 202 LBS | TEMPERATURE: 98 F | HEART RATE: 82 BPM | DIASTOLIC BLOOD PRESSURE: 78 MMHG | SYSTOLIC BLOOD PRESSURE: 118 MMHG | HEIGHT: 69.5 IN | BODY MASS INDEX: 29.25 KG/M2

## 2021-11-08 DIAGNOSIS — I83.813 VARICOSE VEINS OF BILATERAL LOWER EXTREMITIES WITH PAIN: Primary | ICD-10-CM

## 2021-11-08 PROCEDURE — 3074F SYST BP LT 130 MM HG: CPT | Performed by: SURGERY

## 2021-11-08 PROCEDURE — 99213 OFFICE O/P EST LOW 20 MIN: CPT | Performed by: SURGERY

## 2021-11-08 PROCEDURE — 3008F BODY MASS INDEX DOCD: CPT | Performed by: SURGERY

## 2021-11-08 PROCEDURE — 3078F DIAST BP <80 MM HG: CPT | Performed by: SURGERY

## 2021-11-08 NOTE — PROGRESS NOTES
Follow Up Visit Note       Active Problems      1.  Varicose veins of bilateral lower extremities with pain          Chief Complaint   Patient presents with:  Varicose Veins: EP 10/20 radiofrequency ablation leftgreater saphenous vein- Pt. c/o of numbness o Social History    Socioeconomic History      Marital status:     Tobacco Use      Smoking status: Former Smoker        Packs/day: 1.00        Years: 10.00        Pack years: 10        Quit date: 2009        Years since quittin.8      S 82   Temp 97.9 °F (36.6 °C)   Ht 69.5\"   Wt 202 lb (91.6 kg)   BMI 29.40 kg/m²   Physical Exam  Constitutional:       Appearance: He is well-developed. HENT:      Head: Normocephalic and atraumatic. Eyes:      General: No scleral icterus.      Conjunct

## 2022-01-10 ENCOUNTER — HOSPITAL ENCOUNTER (OUTPATIENT)
Dept: CT IMAGING | Age: 52
Discharge: HOME | End: 2022-01-10
Attending: OTOLARYNGOLOGY
Payer: MEDICAID

## 2022-01-10 DIAGNOSIS — J33.9 NASAL POLYP: ICD-10-CM

## 2022-01-10 PROCEDURE — 70486 CT MAXILLOFACIAL W/O DYE: CPT | Performed by: OTOLARYNGOLOGY

## 2022-01-13 NOTE — PROGRESS NOTES
Please call and inform ct sinus stealth showing left sided mild fluid/infection inflammation is present throughout most of the sinuses, please find out if any current sinusitis symptoms and needs to follow up with Dr Jamarcus Wong to further discuss next step

## 2022-01-13 NOTE — PROGRESS NOTES
Pt returned call.  Informed that per KO, \"ct sinus stealth showing left sided mild fluid/infection inflammation is present throughout most of the sinuses, please find out if any current sinusitis symptoms and needs to follow up with Dr Magda Valenzuela to further

## 2022-01-19 ENCOUNTER — LAB ENCOUNTER (OUTPATIENT)
Dept: LAB | Facility: HOSPITAL | Age: 52
End: 2022-01-19
Attending: NURSE PRACTITIONER
Payer: MEDICAID

## 2022-01-19 ENCOUNTER — TELEPHONE (OUTPATIENT)
Dept: FAMILY MEDICINE CLINIC | Facility: CLINIC | Age: 52
End: 2022-01-19

## 2022-01-19 DIAGNOSIS — Z20.822 ENCOUNTER FOR SCREENING LABORATORY TESTING FOR COVID-19 VIRUS: ICD-10-CM

## 2022-01-19 DIAGNOSIS — Z20.822 ENCOUNTER FOR SCREENING LABORATORY TESTING FOR COVID-19 VIRUS: Primary | ICD-10-CM

## 2022-01-19 NOTE — TELEPHONE ENCOUNTER
OK for COVID test today as he has video scheduled (order signed, thanks for pending) . Needs to complete visit tomorrow for further eval. Thanks.

## 2022-01-19 NOTE — TELEPHONE ENCOUNTER
Pt complaints of headaches and runny nose. Pt is scheduled for a video visit tomorrow with Ayesha Encarnacion would like to know if Jose Chahal Can order a covid test priot to the visit?  Pt is trying to  Get appointment scheduled with central scheduling please contact p

## 2022-01-20 ENCOUNTER — TELEMEDICINE (OUTPATIENT)
Dept: FAMILY MEDICINE CLINIC | Facility: CLINIC | Age: 52
End: 2022-01-20

## 2022-01-20 DIAGNOSIS — R51.9 GENERALIZED HEADACHE: Primary | ICD-10-CM

## 2022-01-20 DIAGNOSIS — J34.89 NASAL DRAINAGE: ICD-10-CM

## 2022-01-20 PROCEDURE — 99213 OFFICE O/P EST LOW 20 MIN: CPT | Performed by: NURSE PRACTITIONER

## 2022-01-20 NOTE — PATIENT INSTRUCTIONS
Get rest, drink fluids and eat as able. Do not over-exert yourself now, getting enough rest is important in getting better.      You may use Tylenol (acetaminophen) or Ibuprofen products (Advil, Motrin etc.) as needed for fevers, body aches, h

## 2022-01-20 NOTE — PROGRESS NOTES
Patient presents with:  Covid: runny nose and headache, x2 days      This visit was conducted using Telemedicine with live, two-way interactive video and audio.     Patient understands and accepts financial responsibility for any deductible, co-insurance an deep/exaggerated breaths or coughing noted during conversation.        Skin: warm and pink w/o flushing or obvious diaphoresis     Psych: pleasant and cooperative on video, speech pattern normal.     A/P:    Generalized headache  (primary encounter diagnosi Consults:  None    No follow-ups on file. There are no Patient Instructions on file for this visit. All questions were answered and the patient understands the plan.

## 2022-01-21 LAB — SARS-COV-2 RNA RESP QL NAA+PROBE: NOT DETECTED

## 2022-02-15 ENCOUNTER — TELEPHONE (OUTPATIENT)
Dept: FAMILY MEDICINE CLINIC | Facility: CLINIC | Age: 52
End: 2022-02-15

## 2022-02-15 NOTE — TELEPHONE ENCOUNTER
Pt is scheduled for surgery with Dr. Elgin Mccauley 3/18/2022 for STEALTH ASSITED BILATERAL MAXILLARY ANTROSTOMY, BILATERAL TOTAL ETHMOIDECTOMY AND TURBINATE REDUCTION. Pt Is scheduled to see Dr. Joan Patel for a pre op on 2/22/2022. Pt received a message from Dr. Kenny Ly office stating he needs H&P, BMP, and EKG within 30 days of surgery. Please contact pt once orders are in. Pre op letter not in epic.

## 2022-02-22 ENCOUNTER — OFFICE VISIT (OUTPATIENT)
Dept: FAMILY MEDICINE CLINIC | Facility: CLINIC | Age: 52
End: 2022-02-22
Payer: MEDICAID

## 2022-02-22 VITALS
DIASTOLIC BLOOD PRESSURE: 66 MMHG | RESPIRATION RATE: 17 BRPM | SYSTOLIC BLOOD PRESSURE: 110 MMHG | OXYGEN SATURATION: 98 % | TEMPERATURE: 98 F | WEIGHT: 211.63 LBS | HEART RATE: 101 BPM | HEIGHT: 70 IN | BODY MASS INDEX: 30.3 KG/M2

## 2022-02-22 DIAGNOSIS — J32.9 CHRONIC CONGESTION OF PARANASAL SINUS: ICD-10-CM

## 2022-02-22 DIAGNOSIS — Z01.818 PREOP EXAMINATION: Primary | ICD-10-CM

## 2022-02-22 PROCEDURE — 3008F BODY MASS INDEX DOCD: CPT | Performed by: FAMILY MEDICINE

## 2022-02-22 PROCEDURE — 3078F DIAST BP <80 MM HG: CPT | Performed by: FAMILY MEDICINE

## 2022-02-22 PROCEDURE — 3074F SYST BP LT 130 MM HG: CPT | Performed by: FAMILY MEDICINE

## 2022-02-22 PROCEDURE — 93000 ELECTROCARDIOGRAM COMPLETE: CPT | Performed by: FAMILY MEDICINE

## 2022-02-22 PROCEDURE — 99244 OFF/OP CNSLTJ NEW/EST MOD 40: CPT | Performed by: FAMILY MEDICINE

## 2022-02-25 LAB
BUN: 15 MG/DL (ref 7–25)
CALCIUM: 9.5 MG/DL (ref 8.6–10.3)
CARBON DIOXIDE: 27 MMOL/L (ref 20–32)
CHLORIDE: 102 MMOL/L (ref 98–110)
CREATININE: 1.16 MG/DL (ref 0.7–1.33)
EGFR IF AFRICN AM: 83 ML/MIN/1.73M2
EGFR IF NONAFRICN AM: 72 ML/MIN/1.73M2
GLUCOSE: 91 MG/DL (ref 65–99)
POTASSIUM: 4.2 MMOL/L (ref 3.5–5.3)
SODIUM: 138 MMOL/L (ref 135–146)

## 2022-02-25 NOTE — TELEPHONE ENCOUNTER
Pre op medical clearance faxed to [phone] [facility] Pre admission dept and surgical dept as requested   [address] [facility]  And  Fax: [phone]

## 2022-03-15 ENCOUNTER — LAB ENCOUNTER (OUTPATIENT)
Dept: LAB | Facility: HOSPITAL | Age: 52
End: 2022-03-15
Attending: OTOLARYNGOLOGY
Payer: MEDICAID

## 2022-03-15 DIAGNOSIS — J30.1 SEASONAL ALLERGIC RHINITIS DUE TO POLLEN: ICD-10-CM

## 2022-03-15 RX ORDER — GARLIC EXTRACT 500 MG
1 CAPSULE ORAL DAILY
COMMUNITY

## 2022-03-16 LAB — SARS-COV-2 RNA RESP QL NAA+PROBE: NOT DETECTED

## 2022-03-18 ENCOUNTER — HOSPITAL ENCOUNTER (OUTPATIENT)
Facility: HOSPITAL | Age: 52
Setting detail: HOSPITAL OUTPATIENT SURGERY
Discharge: HOME | End: 2022-03-18
Attending: OTOLARYNGOLOGY | Admitting: OTOLARYNGOLOGY
Payer: MEDICAID

## 2022-03-18 ENCOUNTER — ANESTHESIA (OUTPATIENT)
Dept: SURGERY | Facility: HOSPITAL | Age: 52
End: 2022-03-18
Payer: MEDICAID

## 2022-03-18 ENCOUNTER — ANESTHESIA EVENT (OUTPATIENT)
Dept: SURGERY | Facility: HOSPITAL | Age: 52
End: 2022-03-18
Payer: MEDICAID

## 2022-03-18 VITALS
SYSTOLIC BLOOD PRESSURE: 123 MMHG | BODY MASS INDEX: 29.8 KG/M2 | DIASTOLIC BLOOD PRESSURE: 76 MMHG | HEIGHT: 70 IN | HEART RATE: 89 BPM | WEIGHT: 208.13 LBS | OXYGEN SATURATION: 96 % | RESPIRATION RATE: 18 BRPM | TEMPERATURE: 98 F

## 2022-03-18 DIAGNOSIS — J34.89 NASAL OBSTRUCTION: ICD-10-CM

## 2022-03-18 DIAGNOSIS — J33.9 NASAL POLYP: ICD-10-CM

## 2022-03-18 DIAGNOSIS — J34.3 NASAL TURBINATE HYPERTROPHY: ICD-10-CM

## 2022-03-18 DIAGNOSIS — J30.1 SEASONAL ALLERGIC RHINITIS DUE TO POLLEN: Primary | ICD-10-CM

## 2022-03-18 PROCEDURE — 87102 FUNGUS ISOLATION CULTURE: CPT | Performed by: OTOLARYNGOLOGY

## 2022-03-18 PROCEDURE — 099R8ZZ DRAINAGE OF LEFT MAXILLARY SINUS, VIA NATURAL OR ARTIFICIAL OPENING ENDOSCOPIC: ICD-10-PCS | Performed by: OTOLARYNGOLOGY

## 2022-03-18 PROCEDURE — 87176 TISSUE HOMOGENIZATION CULTR: CPT | Performed by: OTOLARYNGOLOGY

## 2022-03-18 PROCEDURE — 87205 SMEAR GRAM STAIN: CPT | Performed by: OTOLARYNGOLOGY

## 2022-03-18 PROCEDURE — 88311 DECALCIFY TISSUE: CPT | Performed by: OTOLARYNGOLOGY

## 2022-03-18 PROCEDURE — 87075 CULTR BACTERIA EXCEPT BLOOD: CPT | Performed by: OTOLARYNGOLOGY

## 2022-03-18 PROCEDURE — 09TV8ZZ RESECTION OF LEFT ETHMOID SINUS, VIA NATURAL OR ARTIFICIAL OPENING ENDOSCOPIC: ICD-10-PCS | Performed by: OTOLARYNGOLOGY

## 2022-03-18 PROCEDURE — 09TU8ZZ RESECTION OF RIGHT ETHMOID SINUS, VIA NATURAL OR ARTIFICIAL OPENING ENDOSCOPIC: ICD-10-PCS | Performed by: OTOLARYNGOLOGY

## 2022-03-18 PROCEDURE — 87070 CULTURE OTHR SPECIMN AEROBIC: CPT | Performed by: OTOLARYNGOLOGY

## 2022-03-18 PROCEDURE — 88304 TISSUE EXAM BY PATHOLOGIST: CPT | Performed by: OTOLARYNGOLOGY

## 2022-03-18 PROCEDURE — 87206 SMEAR FLUORESCENT/ACID STAI: CPT | Performed by: OTOLARYNGOLOGY

## 2022-03-18 PROCEDURE — 88312 SPECIAL STAINS GROUP 1: CPT | Performed by: OTOLARYNGOLOGY

## 2022-03-18 PROCEDURE — 095L8ZZ DESTRUCTION OF NASAL TURBINATE, VIA NATURAL OR ARTIFICIAL OPENING ENDOSCOPIC: ICD-10-PCS | Performed by: OTOLARYNGOLOGY

## 2022-03-18 PROCEDURE — 099Q8ZZ DRAINAGE OF RIGHT MAXILLARY SINUS, VIA NATURAL OR ARTIFICIAL OPENING ENDOSCOPIC: ICD-10-PCS | Performed by: OTOLARYNGOLOGY

## 2022-03-18 PROCEDURE — 8E09XBZ COMPUTER ASSISTED PROCEDURE OF HEAD AND NECK REGION: ICD-10-PCS | Performed by: OTOLARYNGOLOGY

## 2022-03-18 DEVICE — PROPEL MINI SINUS IMPLANT
Type: IMPLANTABLE DEVICE | Site: SINUS | Status: FUNCTIONAL
Brand: PROPEL MINI

## 2022-03-18 RX ORDER — ONDANSETRON 2 MG/ML
INJECTION INTRAMUSCULAR; INTRAVENOUS AS NEEDED
Status: DISCONTINUED | OUTPATIENT
Start: 2022-03-18 | End: 2022-03-18 | Stop reason: SURG

## 2022-03-18 RX ORDER — MEPERIDINE HYDROCHLORIDE 25 MG/ML
12.5 INJECTION INTRAMUSCULAR; INTRAVENOUS; SUBCUTANEOUS AS NEEDED
Status: DISCONTINUED | OUTPATIENT
Start: 2022-03-18 | End: 2022-03-18

## 2022-03-18 RX ORDER — PREDNISONE 20 MG/1
20 TABLET ORAL DAILY
Qty: 7 TABLET | Refills: 0 | Status: SHIPPED | OUTPATIENT
Start: 2022-03-18 | End: 2022-03-25

## 2022-03-18 RX ORDER — ACETAMINOPHEN 500 MG
1000 TABLET ORAL ONCE
Status: DISCONTINUED | OUTPATIENT
Start: 2022-03-18 | End: 2022-03-18 | Stop reason: HOSPADM

## 2022-03-18 RX ORDER — NALOXONE HYDROCHLORIDE 0.4 MG/ML
80 INJECTION, SOLUTION INTRAMUSCULAR; INTRAVENOUS; SUBCUTANEOUS AS NEEDED
Status: DISCONTINUED | OUTPATIENT
Start: 2022-03-18 | End: 2022-03-18

## 2022-03-18 RX ORDER — SODIUM CHLORIDE, SODIUM LACTATE, POTASSIUM CHLORIDE, CALCIUM CHLORIDE 600; 310; 30; 20 MG/100ML; MG/100ML; MG/100ML; MG/100ML
INJECTION, SOLUTION INTRAVENOUS CONTINUOUS
Status: DISCONTINUED | OUTPATIENT
Start: 2022-03-18 | End: 2022-03-18

## 2022-03-18 RX ORDER — ACETAMINOPHEN 500 MG
1000 TABLET ORAL ONCE AS NEEDED
Status: DISCONTINUED | OUTPATIENT
Start: 2022-03-18 | End: 2022-03-18

## 2022-03-18 RX ORDER — ONDANSETRON 2 MG/ML
4 INJECTION INTRAMUSCULAR; INTRAVENOUS AS NEEDED
Status: DISCONTINUED | OUTPATIENT
Start: 2022-03-18 | End: 2022-03-18

## 2022-03-18 RX ORDER — AZITHROMYCIN 250 MG/1
TABLET, FILM COATED ORAL
Qty: 6 TABLET | Refills: 0 | Status: SHIPPED | OUTPATIENT
Start: 2022-03-18

## 2022-03-18 RX ORDER — LIDOCAINE HYDROCHLORIDE 10 MG/ML
INJECTION, SOLUTION EPIDURAL; INFILTRATION; INTRACAUDAL; PERINEURAL AS NEEDED
Status: DISCONTINUED | OUTPATIENT
Start: 2022-03-18 | End: 2022-03-18 | Stop reason: SURG

## 2022-03-18 RX ORDER — TRAMADOL HYDROCHLORIDE 50 MG/1
50 TABLET ORAL ONCE AS NEEDED
Status: DISCONTINUED | OUTPATIENT
Start: 2022-03-18 | End: 2022-03-18

## 2022-03-18 RX ORDER — OXYCODONE HYDROCHLORIDE 5 MG/1
5 TABLET ORAL ONCE AS NEEDED
Status: DISCONTINUED | OUTPATIENT
Start: 2022-03-18 | End: 2022-03-18

## 2022-03-18 RX ORDER — GLYCOPYRROLATE 0.2 MG/ML
INJECTION, SOLUTION INTRAMUSCULAR; INTRAVENOUS AS NEEDED
Status: DISCONTINUED | OUTPATIENT
Start: 2022-03-18 | End: 2022-03-18 | Stop reason: SURG

## 2022-03-18 RX ORDER — CEFAZOLIN SODIUM/WATER 2 G/20 ML
2 SYRINGE (ML) INTRAVENOUS ONCE
Status: DISCONTINUED | OUTPATIENT
Start: 2022-03-18 | End: 2022-03-18 | Stop reason: HOSPADM

## 2022-03-18 RX ORDER — HYDROCODONE BITARTRATE AND ACETAMINOPHEN 5; 325 MG/1; MG/1
2 TABLET ORAL AS NEEDED
Status: DISCONTINUED | OUTPATIENT
Start: 2022-03-18 | End: 2022-03-18

## 2022-03-18 RX ORDER — LIDOCAINE HYDROCHLORIDE AND EPINEPHRINE 10; 10 MG/ML; UG/ML
INJECTION, SOLUTION INFILTRATION; PERINEURAL AS NEEDED
Status: DISCONTINUED | OUTPATIENT
Start: 2022-03-18 | End: 2022-03-18

## 2022-03-18 RX ORDER — LABETALOL HYDROCHLORIDE 5 MG/ML
5 INJECTION, SOLUTION INTRAVENOUS EVERY 5 MIN PRN
Status: DISCONTINUED | OUTPATIENT
Start: 2022-03-18 | End: 2022-03-18

## 2022-03-18 RX ORDER — DEXAMETHASONE SODIUM PHOSPHATE 4 MG/ML
VIAL (ML) INJECTION AS NEEDED
Status: DISCONTINUED | OUTPATIENT
Start: 2022-03-18 | End: 2022-03-18 | Stop reason: SURG

## 2022-03-18 RX ORDER — ROCURONIUM BROMIDE 10 MG/ML
INJECTION, SOLUTION INTRAVENOUS AS NEEDED
Status: DISCONTINUED | OUTPATIENT
Start: 2022-03-18 | End: 2022-03-18 | Stop reason: SURG

## 2022-03-18 RX ORDER — HYDROMORPHONE HYDROCHLORIDE 1 MG/ML
INJECTION, SOLUTION INTRAMUSCULAR; INTRAVENOUS; SUBCUTANEOUS
Status: COMPLETED
Start: 2022-03-18 | End: 2022-03-18

## 2022-03-18 RX ORDER — DIPHENHYDRAMINE HYDROCHLORIDE 50 MG/ML
12.5 INJECTION INTRAMUSCULAR; INTRAVENOUS AS NEEDED
Status: DISCONTINUED | OUTPATIENT
Start: 2022-03-18 | End: 2022-03-18

## 2022-03-18 RX ORDER — NEOSTIGMINE METHYLSULFATE 1 MG/ML
INJECTION INTRAVENOUS AS NEEDED
Status: DISCONTINUED | OUTPATIENT
Start: 2022-03-18 | End: 2022-03-18 | Stop reason: SURG

## 2022-03-18 RX ORDER — KETAMINE HYDROCHLORIDE 50 MG/ML
INJECTION, SOLUTION, CONCENTRATE INTRAMUSCULAR; INTRAVENOUS AS NEEDED
Status: DISCONTINUED | OUTPATIENT
Start: 2022-03-18 | End: 2022-03-18 | Stop reason: SURG

## 2022-03-18 RX ORDER — HYDROCODONE BITARTRATE AND ACETAMINOPHEN 5; 325 MG/1; MG/1
1 TABLET ORAL AS NEEDED
Status: DISCONTINUED | OUTPATIENT
Start: 2022-03-18 | End: 2022-03-18

## 2022-03-18 RX ORDER — HYDROMORPHONE HYDROCHLORIDE 1 MG/ML
0.4 INJECTION, SOLUTION INTRAMUSCULAR; INTRAVENOUS; SUBCUTANEOUS EVERY 5 MIN PRN
Status: DISCONTINUED | OUTPATIENT
Start: 2022-03-18 | End: 2022-03-18

## 2022-03-18 RX ORDER — MIDAZOLAM HYDROCHLORIDE 1 MG/ML
1 INJECTION INTRAMUSCULAR; INTRAVENOUS EVERY 5 MIN PRN
Status: DISCONTINUED | OUTPATIENT
Start: 2022-03-18 | End: 2022-03-18

## 2022-03-18 RX ORDER — METOCLOPRAMIDE HYDROCHLORIDE 5 MG/ML
10 INJECTION INTRAMUSCULAR; INTRAVENOUS AS NEEDED
Status: DISCONTINUED | OUTPATIENT
Start: 2022-03-18 | End: 2022-03-18

## 2022-03-18 RX ORDER — HYDROCODONE BITARTRATE AND ACETAMINOPHEN 5; 325 MG/1; MG/1
1-2 TABLET ORAL EVERY 6 HOURS PRN
Qty: 20 TABLET | Refills: 0 | Status: SHIPPED | OUTPATIENT
Start: 2022-03-18

## 2022-03-18 RX ADMIN — ONDANSETRON 4 MG: 2 INJECTION INTRAMUSCULAR; INTRAVENOUS at 15:50:00

## 2022-03-18 RX ADMIN — DEXAMETHASONE SODIUM PHOSPHATE 12 MG: 4 MG/ML VIAL (ML) INJECTION at 15:00:00

## 2022-03-18 RX ADMIN — SODIUM CHLORIDE, SODIUM LACTATE, POTASSIUM CHLORIDE, CALCIUM CHLORIDE: 600; 310; 30; 20 INJECTION, SOLUTION INTRAVENOUS at 14:57:00

## 2022-03-18 RX ADMIN — GLYCOPYRROLATE 0.8 MG: 0.2 INJECTION, SOLUTION INTRAMUSCULAR; INTRAVENOUS at 15:52:00

## 2022-03-18 RX ADMIN — NEOSTIGMINE METHYLSULFATE 5 MG: 1 INJECTION INTRAVENOUS at 15:52:00

## 2022-03-18 RX ADMIN — KETAMINE HYDROCHLORIDE 50 MG: 50 INJECTION, SOLUTION, CONCENTRATE INTRAMUSCULAR; INTRAVENOUS at 15:00:00

## 2022-03-18 RX ADMIN — LIDOCAINE HYDROCHLORIDE 100 MG: 10 INJECTION, SOLUTION EPIDURAL; INFILTRATION; INTRACAUDAL; PERINEURAL at 15:00:00

## 2022-03-18 RX ADMIN — ROCURONIUM BROMIDE 50 MG: 10 INJECTION, SOLUTION INTRAVENOUS at 15:00:00

## 2022-03-18 RX ADMIN — SODIUM CHLORIDE, SODIUM LACTATE, POTASSIUM CHLORIDE, CALCIUM CHLORIDE: 600; 310; 30; 20 INJECTION, SOLUTION INTRAVENOUS at 16:00:00

## 2022-03-18 NOTE — BRIEF OP NOTE
Pre-Operative Diagnosis: Seasonal allergic rhinitis due to pollen [J30.1]  Nasal turbinate hypertrophy [J34.3]  Nasal polyp [J33.9]  Nasal obstruction [J34.89]     Post-Operative Diagnosis: Seasonal allergic rhinitis due to pollen [J30. 1]Nasal turbinate hypertrophy [S53. 3]Nasal polyp [N87. 9]Nasal obstruction [J34.89]      Procedure Performed:   STEALTH ASSISTED BILATERAL MAXILLARY ANTROSTOMY, BILATERAL TOTAL ETHMOIDECTOMY AND TURBINATE REDUCTION    Surgeon(s) and Role:     Barbara Elizabeth MD - Primary    Assistant(s):        Surgical Findings: polyps extensive left mucoid in max      Specimen: to path      Estimated Blood Loss: Blood Output: 50 mL (3/18/2022  4:09 PM)      Dictation Number:      Pascale Yuan MD  3/18/2022  4:10 PM

## 2022-03-18 NOTE — ANESTHESIA PROCEDURE NOTES
Airway  Date/Time: 3/18/2022 3:01 PM  Urgency: elective    Airway not difficult    General Information and Staff    Patient location during procedure: OR  Anesthesiologist: Alfred Barnes MD  Performed: anesthesiologist     Indications and Patient Condition  Indications for airway management: anesthesia  Sedation level: deep  Preoxygenated: yes  Patient position: sniffing  Mask difficulty assessment: 1 - vent by mask    Final Airway Details  Final airway type: endotracheal airway      Successful airway: ETT  Cuffed: yes   Successful intubation technique: direct laryngoscopy  Facilitating devices/methods: intubating stylet  Endotracheal tube insertion site: oral  Blade: Diamond  Blade size: #4  ETT size (mm): 7.5    Cormack-Lehane Classification: grade IIA - partial view of glottis  Placement verified by: chest auscultation and capnometry   Measured from: teeth  ETT to teeth (cm): 21  Number of attempts at approach: 1  Number of other approaches attempted: 0    Additional Comments  Easy intubation. No evidence of facial, dental, or oral trauma.     Moe Giang MD  [facility]

## 2022-03-18 NOTE — H&P
H&P has been reviewed today showing persisten chronic sinusitis lamberto opoliposis no significant allergies heart regular rate and rhythm lungs are clear proceed with surgical reduction as planned

## 2022-03-19 NOTE — OPERATIVE REPORT
[address]    PATIENT'S NAME: Ghada Mon   ATTENDING PHYSICIAN: Van Nunn M.D. OPERATING PHYSICIAN: Van Nunn M.D. PATIENT ACCOUNT#:   [de-identified]    LOCATION:  [facility] 1 [facility] 10  MEDICAL RECORD #:   [id number]       YOB: 1970  ADMISSION DATE:       03/18/2022      OPERATION DATE:  03/18/2022    OPERATIVE REPORT      PREOPERATIVE DIAGNOSIS:  Chronic sinusitis, nasal polyposis, as well as bilateral inferior turbinate hypertrophy. POSTOPERATIVE DIAGNOSIS:  Chronic sinusitis, nasal polyposis, as well as bilateral inferior turbinate hypertrophy. PROCEDURE:    1. Stealth endoscopic sinus surgery. 2.   Bilateral maxillary antrostomies. 3.   Bilateral total ethmoidectomies. 4.   Bilateral inferior turbinate reduction with Coblation. ANESTHESIA:  General.      COMPLICATIONS:  None. DISPOSITION:  To Recovery in stable condition. INDICATIONS:  This is a 54-year-old male with persistent chronic sinusitis, nasal obstruction, and sense of smell issues. He has been aggressively treated with oral prednisone, oral antibiotics, topical sprays, including antihistamines, decongestants, and leukotriene receptor blockers. He has failed medical therapy. He is recommended to undergo the above operation. Risks and benefits including, but not limited to, bleeding, infection, possibility for eye injury, brain injury, brain fluid leak, recurrence of polyps, possibility for CSF leak, possibility for need for additional surgery in the future, and persistent sinus nasal sense of smell disorder. The patient signed the consent form. FINDINGS:  Includes extensive polyposis including into the medial portion of the middle turbinates bilaterally, into the choana on the left side more than the right side, as well as left-sided thick mucoid maxillary contents which was sent in Lukens trap.      OPERATIVE TECHNIQUE:  Patient brought to the operating room and placed in supine position. Given a general anesthetic via mask inhalation. Patient intubated. Topical Afrin was placed intranasally. The Stealth system was calibrated to normal paranasal sinuses. The 0-degree telescope in combination with the 45-degree telescope was utilized throughout the case. The right side was approached first.  Using a microdebrider, the polyp tissue was removed from the posterior nasal cavity as well as medial to the middle turbinate into the superior meatus region as well as lateral to the middle turbinate. The polypoid tissue was taken off into the bulla and through the basal lamella on the right side. The patient had the maxillary antrostomy site opened widely using the microdebrider and straight biting forceps and straight Blakesley as well as side-biting forceps. The patient had the inferior turbinate outfractured. The left side was approached in a similar fashion. The left side showed thick mucoid material in the maxillary sinus region. This was evacuated and sent for culture. The patient had the anterior, posterior, and middle portion of the ethmoid cavity taken down with the microdebrider and mucoid polyps were removed. Extensive polyps were found into the choana on this side, coming from the superior meatus. This was also debrided back and removed with straight biting forceps and Blakesley forceps using a combination of 45-degree telescopes and 45-degree biters. Additional work was done in the anterior ethmoid area. Both middle turbinates were found to be floppy. They were left intact. Suction cautery was applied to the right middle turbinate utilizing the Coblation unit. The Propel stents were placed both in the ethmoid cavity on the left side as well as on the right side.   The patient did have the inferior turbinates outfractured bilaterally with the Boies elevator and then underwent submucosal reduction bilaterally using the Coblation unit in the anterior, posterior, and middle portion of the inferior turbinates. Specimens were sent to Pathology. Patient was awoken from anesthetic and brought to recovery room in stable condition.      Dictated By Laverne Sanchez M.D.  d: 03/18/2022 16:17:16  t: 03/18/2022 20:22:48  Sterling Mcmullen [id number]  JJD/

## 2022-04-14 ENCOUNTER — TELEPHONE (OUTPATIENT)
Dept: FAMILY MEDICINE CLINIC | Facility: CLINIC | Age: 52
End: 2022-04-14

## 2022-04-14 DIAGNOSIS — Z13.220 SCREENING FOR LIPID DISORDERS: ICD-10-CM

## 2022-04-14 DIAGNOSIS — Z13.0 SCREENING FOR BLOOD DISEASE: Primary | ICD-10-CM

## 2022-04-14 DIAGNOSIS — Z13.228 SCREENING FOR METABOLIC DISORDER: ICD-10-CM

## 2022-04-14 DIAGNOSIS — Z12.5 SCREENING FOR PROSTATE CANCER: ICD-10-CM

## 2022-04-14 DIAGNOSIS — Z13.29 SCREENING FOR THYROID DISORDER: ICD-10-CM

## 2022-04-14 NOTE — TELEPHONE ENCOUNTER
Please enter lab orders for the patient's upcoming physical appointment. Physical scheduled: Your appointments     Date & Time Appointment Department [facility])    Apr 25, 2022  9:30 AM CDT Adult Physical with Humza Wood  [address]  ([address])    PLEASE NOTE - Most insurances allow a Complete Physical once every 366 days. Please schedule accordingly. Please arrive 15 minutes prior to your scheduled appointment. Please also bring your Insurance card, Photo ID, and your medication bottles or a list of your current medication. If you no longer require this appointment, please contact your physician office to cancel. Johanne Aschoff Dr Margherita Armour [id number] [facility] [phone]         Preferred lab: [facility]     The patient has been notified to complete fasting labs prior to their physical appointment.

## 2022-04-25 ENCOUNTER — OFFICE VISIT (OUTPATIENT)
Dept: FAMILY MEDICINE CLINIC | Facility: CLINIC | Age: 52
End: 2022-04-25
Payer: MEDICAID

## 2022-04-25 VITALS
WEIGHT: 208.38 LBS | BODY MASS INDEX: 29.83 KG/M2 | DIASTOLIC BLOOD PRESSURE: 64 MMHG | HEIGHT: 70 IN | TEMPERATURE: 99 F | RESPIRATION RATE: 14 BRPM | SYSTOLIC BLOOD PRESSURE: 118 MMHG | HEART RATE: 96 BPM

## 2022-04-25 DIAGNOSIS — M79.671 PAIN OF RIGHT HEEL: ICD-10-CM

## 2022-04-25 DIAGNOSIS — Z00.00 ROUTINE PHYSICAL EXAMINATION: Primary | ICD-10-CM

## 2022-04-25 PROCEDURE — 3008F BODY MASS INDEX DOCD: CPT | Performed by: NURSE PRACTITIONER

## 2022-04-25 PROCEDURE — 99396 PREV VISIT EST AGE 40-64: CPT | Performed by: NURSE PRACTITIONER

## 2022-04-25 PROCEDURE — 3074F SYST BP LT 130 MM HG: CPT | Performed by: NURSE PRACTITIONER

## 2022-04-25 PROCEDURE — 3078F DIAST BP <80 MM HG: CPT | Performed by: NURSE PRACTITIONER

## 2022-04-25 NOTE — PATIENT INSTRUCTIONS
Please contact your insurance provider, get the name of a covered gastroenterologist and notify our office with the name. I will enter a referral for you to get your colonoscopy.

## 2022-05-05 LAB
ABSOLUTE BASOPHILS: 62 CELLS/UL (ref 0–200)
ABSOLUTE EOSINOPHILS: 250 CELLS/UL (ref 15–500)
ABSOLUTE LYMPHOCYTES: 1358 CELLS/UL (ref 850–3900)
ABSOLUTE MONOCYTES: 514 CELLS/UL (ref 200–950)
ABSOLUTE NEUTROPHILS: 2616 CELLS/UL (ref 1500–7800)
ALBUMIN/GLOBULIN RATIO: 1.7 (CALC) (ref 1–2.5)
ALBUMIN: 4.7 G/DL (ref 3.6–5.1)
ALKALINE PHOSPHATASE: 59 U/L (ref 35–144)
ALT: 19 U/L (ref 9–46)
AST: 17 U/L (ref 10–35)
BASOPHILS: 1.3 %
BILIRUBIN, TOTAL: 0.6 MG/DL (ref 0.2–1.2)
BUN: 13 MG/DL (ref 7–25)
CALCIUM: 10 MG/DL (ref 8.6–10.3)
CARBON DIOXIDE: 30 MMOL/L (ref 20–32)
CHLORIDE: 103 MMOL/L (ref 98–110)
CHOL/HDLC RATIO: 3.6 (CALC)
CHOLESTEROL, TOTAL: 257 MG/DL
CREATININE: 1.14 MG/DL (ref 0.7–1.33)
EGFR IF AFRICN AM: 85 ML/MIN/1.73M2
EGFR IF NONAFRICN AM: 73 ML/MIN/1.73M2
EOSINOPHILS: 5.2 %
GLOBULIN: 2.7 G/DL (CALC) (ref 1.9–3.7)
GLUCOSE: 91 MG/DL (ref 65–139)
HDL CHOLESTEROL: 71 MG/DL
HEMATOCRIT: 45.4 % (ref 38.5–50)
HEMOGLOBIN: 15.8 G/DL (ref 13.2–17.1)
LDL-CHOLESTEROL: 160 MG/DL (CALC)
LYMPHOCYTES: 28.3 %
MCH: 32.3 PG (ref 27–33)
MCHC: 34.8 G/DL (ref 32–36)
MCV: 92.8 FL (ref 80–100)
MONOCYTES: 10.7 %
MPV: 11.3 FL (ref 7.5–12.5)
NEUTROPHILS: 54.5 %
NON-HDL CHOLESTEROL: 186 MG/DL (CALC)
PLATELET COUNT: 195 THOUSAND/UL (ref 140–400)
POTASSIUM: 4.7 MMOL/L (ref 3.5–5.3)
PROTEIN, TOTAL: 7.4 G/DL (ref 6.1–8.1)
PSA, TOTAL: 0.74 NG/ML
RDW: 13.1 % (ref 11–15)
RED BLOOD CELL COUNT: 4.89 MILLION/UL (ref 4.2–5.8)
SODIUM: 139 MMOL/L (ref 135–146)
TRIGLYCERIDES: 137 MG/DL
TSH W/REFLEX TO FT4: 0.85 MIU/L (ref 0.4–4.5)
WHITE BLOOD CELL COUNT: 4.8 THOUSAND/UL (ref 3.8–10.8)

## 2022-05-20 ENCOUNTER — PATIENT MESSAGE (OUTPATIENT)
Dept: FAMILY MEDICINE CLINIC | Facility: CLINIC | Age: 52
End: 2022-05-20

## 2022-05-22 ENCOUNTER — TELEPHONE (OUTPATIENT)
Dept: INTERNAL MEDICINE CLINIC | Facility: CLINIC | Age: 52
End: 2022-05-22

## 2022-05-22 NOTE — TELEPHONE ENCOUNTER
Pt called this AM   Tested + for COVID at home and would like to be retested at [facility]    Has slight nasal congestion    D/w pt  No need to retest as he has slight s/s and tested + already    rec follow the s/s for now, rest, hydrate , isolate  and call if gets worse

## 2022-05-23 ENCOUNTER — TELEPHONE (OUTPATIENT)
Dept: FAMILY MEDICINE CLINIC | Facility: CLINIC | Age: 52
End: 2022-05-23

## 2022-05-23 ENCOUNTER — LAB ENCOUNTER (OUTPATIENT)
Dept: LAB | Age: 52
End: 2022-05-23
Attending: NURSE PRACTITIONER
Payer: MEDICAID

## 2022-05-23 ENCOUNTER — PATIENT MESSAGE (OUTPATIENT)
Dept: FAMILY MEDICINE CLINIC | Facility: CLINIC | Age: 52
End: 2022-05-23

## 2022-05-23 DIAGNOSIS — J02.9 SORE THROAT: ICD-10-CM

## 2022-05-23 DIAGNOSIS — R05.9 COUGH: ICD-10-CM

## 2022-05-23 DIAGNOSIS — J02.9 SORE THROAT: Primary | ICD-10-CM

## 2022-05-23 NOTE — TELEPHONE ENCOUNTER
Patient notified. Patient verbalized understanding of information given. He already went for PCR testing today. Denies any questions or concerns. Will keep appointment as scheduled. To ER if any severe symptoms or breathing issues.

## 2022-05-23 NOTE — TELEPHONE ENCOUNTER
Should keep appointment. Home tests are consistently accurate so he doesn't need to do an official test unless he needs it for work or something. Do I did order COVID testing today, if he needs it for that.

## 2022-05-23 NOTE — TELEPHONE ENCOUNTER
Pt dis an at home Covid test today that was positive. He has sore throat, congestion and cough. Did schedule a video for 5/25/22 with Mar Flies .  Will you order test prior to or should pt keep this appt? (per triage to ask you)

## 2022-05-23 NOTE — TELEPHONE ENCOUNTER
From: Vicky Nguyen  To: Reno Ying NP  Sent: 5/23/2022 10:13 AM CDT  Subject: Covid test    Hi Doctor Erasto Rodriguez,    My coworker has tested positive for Covid and I have taken a home test that has come up positive. Can you please place an order for a ppr test for me?     Thank you,  Terry Foley

## 2022-05-25 ENCOUNTER — TELEMEDICINE (OUTPATIENT)
Dept: FAMILY MEDICINE CLINIC | Facility: CLINIC | Age: 52
End: 2022-05-25

## 2022-05-25 DIAGNOSIS — U07.1 COVID-19 VIRUS INFECTION: Primary | ICD-10-CM

## 2022-05-25 LAB — SARS-COV-2 RNA RESP QL NAA+PROBE: DETECTED

## 2022-05-25 PROCEDURE — 99213 OFFICE O/P EST LOW 20 MIN: CPT | Performed by: NURSE PRACTITIONER

## 2023-05-20 ENCOUNTER — TELEPHONE (OUTPATIENT)
Dept: FAMILY MEDICINE CLINIC | Facility: CLINIC | Age: 53
End: 2023-05-20

## 2023-05-20 DIAGNOSIS — Z12.5 SCREENING FOR PROSTATE CANCER: ICD-10-CM

## 2023-05-20 DIAGNOSIS — Z13.220 SCREENING FOR LIPID DISORDERS: ICD-10-CM

## 2023-05-20 DIAGNOSIS — Z13.0 SCREENING FOR BLOOD DISEASE: Primary | ICD-10-CM

## 2023-05-20 DIAGNOSIS — Z13.29 SCREENING FOR THYROID DISORDER: ICD-10-CM

## 2023-05-20 DIAGNOSIS — Z13.228 SCREENING FOR METABOLIC DISORDER: ICD-10-CM

## 2023-05-20 NOTE — TELEPHONE ENCOUNTER
Please enter lab orders for the patient's upcoming physical appointment. Physical scheduled: Your appointments     Date & Time Appointment Department [facility])    Jun 05, 2023  3:45 PM CDT Adult [facility] [address], [place] ([address])    PLEASE NOTE - Most insurances allow a Complete Physical once every 366 days. Please schedule accordingly. Please arrive 15 minutes prior to your scheduled appointment. Please also bring your Insurance card, Photo ID, and your medication bottles or a list of your current medication. If you no longer require this appointment, please contact your physician office to cancel. [place] [address] [facility] [place] [address] [phone]         Preferred lab: [facility]     The patient has been notified to complete fasting labs prior to their physical appointment.

## 2023-06-05 ENCOUNTER — OFFICE VISIT (OUTPATIENT)
Dept: FAMILY MEDICINE CLINIC | Facility: CLINIC | Age: 53
End: 2023-06-05
Payer: MEDICAID

## 2023-06-05 VITALS
WEIGHT: 212 LBS | HEART RATE: 80 BPM | TEMPERATURE: 99 F | BODY MASS INDEX: 30.35 KG/M2 | SYSTOLIC BLOOD PRESSURE: 120 MMHG | DIASTOLIC BLOOD PRESSURE: 72 MMHG | RESPIRATION RATE: 16 BRPM | HEIGHT: 70 IN

## 2023-06-05 DIAGNOSIS — Z00.00 ROUTINE PHYSICAL EXAMINATION: Primary | ICD-10-CM

## 2023-06-05 DIAGNOSIS — B35.1 ONYCHOMYCOSIS: ICD-10-CM

## 2023-06-05 DIAGNOSIS — I83.813 VARICOSE VEINS OF BILATERAL LOWER EXTREMITIES WITH PAIN: ICD-10-CM

## 2023-06-05 DIAGNOSIS — Z12.11 SCREENING FOR COLON CANCER: ICD-10-CM

## 2023-06-05 DIAGNOSIS — L84 CORN OF FOOT: ICD-10-CM

## 2023-06-05 PROCEDURE — 3008F BODY MASS INDEX DOCD: CPT | Performed by: NURSE PRACTITIONER

## 2023-06-05 PROCEDURE — 3078F DIAST BP <80 MM HG: CPT | Performed by: NURSE PRACTITIONER

## 2023-06-05 PROCEDURE — 3074F SYST BP LT 130 MM HG: CPT | Performed by: NURSE PRACTITIONER

## 2023-06-05 PROCEDURE — 99396 PREV VISIT EST AGE 40-64: CPT | Performed by: NURSE PRACTITIONER

## 2023-06-05 PROCEDURE — 90471 IMMUNIZATION ADMIN: CPT | Performed by: NURSE PRACTITIONER

## 2023-06-05 PROCEDURE — 90715 TDAP VACCINE 7 YRS/> IM: CPT | Performed by: NURSE PRACTITIONER

## 2023-07-12 ENCOUNTER — OFFICE VISIT (OUTPATIENT)
Dept: PODIATRY CLINIC | Facility: CLINIC | Age: 53
End: 2023-07-12

## 2023-07-12 DIAGNOSIS — Q82.8 PUNCTATE POROKERATOSIS: Primary | ICD-10-CM

## 2023-07-12 DIAGNOSIS — B35.1 ONYCHOMYCOSIS: ICD-10-CM

## 2023-07-12 DIAGNOSIS — L85.3 XEROSIS OF SKIN: ICD-10-CM

## 2023-07-12 DIAGNOSIS — M77.42 METATARSALGIA OF LEFT FOOT: ICD-10-CM

## 2023-07-12 PROCEDURE — 87101 SKIN FUNGI CULTURE: CPT | Performed by: PODIATRIST

## 2023-07-16 NOTE — PROGRESS NOTES
Savannah Wiley is a 46year old male. Patient presents with:  Consult: Corns to left foot. Bilateral  nail fungus. Patient states he uses corn remover pads which help with pain. HPI:   This pleasant gentleman presents to the clinic with a chief complaint of corns on his left foot they bother him quite a bit when he stands or walks and he has bilateral toenail fungus which continues to bother him he is try topicals with no relief. At today's visit reviewed nurse's history as taken above, allergies medications and medical history as documented below. All changes duly noted  Allergies: Patient has no known allergies. Current Outpatient Medications   Medication Sig Dispense Refill    Multiple Vitamin (MULTIVITAMIN ADULT OR) Take 1 capsule by mouth daily. Omega-3 Fatty Acids (OMEGA-3 2100 OR) Take 1 capsule by mouth daily. ELDERBERRY OR Take by mouth daily. Acidophilus/Pectin Oral Cap Take 1 capsule by mouth daily. Levocetirizine Dihydrochloride (XYZAL OR) Take 1 capsule by mouth daily as needed. Fluticasone Propionate 93 MCG/ACT Nasal Exhaler Suspension 1 spray by Nasal route 2 (two) times daily. 16 mL 3    Azelastine HCl 137 MCG/SPRAY Nasal Solution 1 spray by Nasal route 2 (two) times daily.  30 mL 11      Past Medical History:   Diagnosis Date    Bad breath 2017    from my wife    Belching     occasional    Calculus of kidney 2010    passed    Flatulence/gas pain/belching     occasional    Heartburn many years ago    not often    High cholesterol 2018    borderline    Pain in joints 2018    knees    Peripheral vascular disease (HCC)     varicose vein surgery     Visual impairment     glasses    Wears glasses 2015    glasses    Weight gain couple years ago    can't seem to lose it      Past Surgical History:   Procedure Laterality Date    MYRINGOTOMY, LASER-ASSISTED      as a child    TONSILLECTOMY      as a child      Family History   Problem Relation Age of Onset    No Known Problems Father     Cancer Mother         uterine    Ovarian Cancer Mother         passed away    No Known Problems Daughter     No Known Problems Son     Asthma Brother     Other (Other) Son         autism      Social History    Socioeconomic History      Marital status:     Tobacco Use      Smoking status: Former        Packs/day: 1.00        Years: 10.00        Pack years: 10        Types: Cigarettes        Quit date: 2009        Years since quittin.5      Smokeless tobacco: Never    Vaping Use      Vaping Use: Never used    Substance and Sexual Activity      Alcohol use: Yes        Alcohol/week: 1.0 standard drink of alcohol        Types: 1 Standard drinks or equivalent per week        Comment: 1 drink a week      Drug use: Yes        Frequency: 2.0 times per week        Types: Cannabis        Comment: weekends    Other Topics      Concerns:        Caffeine Concern: Yes          coffee- 1-2  cups daily        Exercise: Yes          walks daily        Seat Belt: Yes          REVIEW OF SYSTEMS:   Today reviewed systens as documented below  GENERAL HEALTH: feels well otherwise  SKIN: Refer to exam below  RESPIRATORY: denies shortness of breath with exertion  CARDIOVASCULAR: denies chest pain on exertion  GI: denies abdominal pain and denies heartburn  NEURO: denies headaches    EXAM:   There were no vitals taken for this visit. GENERAL: well developed, well nourished, in no apparent distress  EXTREMITIES:   1. Integument: The skin on his feet is warm and dry several of his lesser toenails have abnormal thickening with yellow dystrophic changes subungual debris and distal lysis from the nailbed. The patient has a punctate porokeratosis on the bottom of his left foot. 2. Vascular: The patient has palpable pulses both dorsalis pedis and posterior tibial bilaterally are symmetrical and equivocal   3. Neurologic: Patient has intact sensorium there are no deficits noted   4.  Musculoskeletal: The patient has good muscle strength. ASSESSMENT AND PLAN:   Diagnoses and all orders for this visit:    Punctate porokeratosis    Metatarsalgia of left foot    Xerosis of skin    Onychomycosis  -     FUNGUS DERMATOPHYTE SKIN, HAIR, NAIL CULTURE; Future        Plan: Today took a clipping of the most affected toenail to send for fungal culture he was told that it could take 3 to 4 weeks to get back and I reviewed prescription topicals doing nothing at all routine trimming pedicures and Lamisil tablet therapy. Trimmed down and debrided hyperkeratoses gave him home care instructions on how to take care of that and then follow-up with us again when the callus bothers him and we will try to get him on Lamisil tablet therapy. The patient indicates understanding of these issues and agrees to the plan.     Cruz Vela DPM

## 2023-08-17 ENCOUNTER — TELEPHONE (OUTPATIENT)
Dept: PODIATRY CLINIC | Facility: CLINIC | Age: 53
End: 2023-08-17

## 2023-08-17 NOTE — TELEPHONE ENCOUNTER
----- Message from Lance Ahumada, DPM sent at 8/10/2023  2:30 PM CDT -----  Results reviewed. Please inform patient that fungal culture results are negative and we should proceed with kerasol therapy. Please tell the patient to purchase it at their pharmacy of choice and follow label directions and then follow-up with me in 3 months.

## 2025-02-17 ENCOUNTER — TELEPHONE (OUTPATIENT)
Dept: FAMILY MEDICINE CLINIC | Facility: CLINIC | Age: 55
End: 2025-02-17

## 2025-02-17 DIAGNOSIS — Z13.228 SCREENING FOR METABOLIC DISORDER: ICD-10-CM

## 2025-02-17 DIAGNOSIS — Z13.0 SCREENING FOR BLOOD DISEASE: Primary | ICD-10-CM

## 2025-02-17 DIAGNOSIS — Z13.29 SCREENING FOR THYROID DISORDER: ICD-10-CM

## 2025-02-17 DIAGNOSIS — Z13.220 SCREENING FOR LIPID DISORDERS: ICD-10-CM

## 2025-02-17 DIAGNOSIS — Z12.5 SCREENING FOR PROSTATE CANCER: ICD-10-CM

## 2025-02-17 NOTE — TELEPHONE ENCOUNTER
Please enter lab orders for the patient's upcoming physical appointment.     Physical scheduled:   Your appointments       Date & Time Appointment Department ([place])    Feb 20, 2025 2:15 PM CST Adult Physical with Jose Reyes NP [facility] ([facility])    PLEASE NOTE - Most insurances allow a Complete Physical once every 366 days. Please schedule accordingly.    Please arrive 15 minutes prior to your scheduled appointment. Please also bring your Insurance card, Photo ID, and your medication bottles or a list of your current medication.    If you no longer require this appointment, please contact your physician office to cancel.              [facility]  [address] [place] [phone]           Preferred lab: [facility]     The patient has been notified to complete fasting labs prior to their physical appointment.

## 2025-02-20 ENCOUNTER — OFFICE VISIT (OUTPATIENT)
Dept: FAMILY MEDICINE CLINIC | Facility: CLINIC | Age: 55
End: 2025-02-20
Payer: COMMERCIAL

## 2025-02-20 VITALS
HEIGHT: 70 IN | HEART RATE: 91 BPM | BODY MASS INDEX: 31.92 KG/M2 | WEIGHT: 223 LBS | TEMPERATURE: 99 F | SYSTOLIC BLOOD PRESSURE: 122 MMHG | OXYGEN SATURATION: 98 % | DIASTOLIC BLOOD PRESSURE: 66 MMHG | RESPIRATION RATE: 18 BRPM

## 2025-02-20 DIAGNOSIS — Z12.11 SCREENING FOR COLON CANCER: ICD-10-CM

## 2025-02-20 DIAGNOSIS — Z00.00 ROUTINE PHYSICAL EXAMINATION: Primary | ICD-10-CM

## 2025-02-20 NOTE — PROGRESS NOTES
Kristian Monroy is a 54 year old male who presents for a complete physical exam.     HPI:   Pt has no acute complaints.    Had varicose vein procedure in 2021, reports veins are much better but still prominent in right leg. Denies swelling of leg or redness.     Works as  for [employer] (located in [employer] locations). Is  with 2 children. Does not exercise routinely. Does not watch diet. Reports is a non-smoker. Drinks alcohol approx 1-2 times a month.     Colon cancer screening:  ordered as below    Wt Readings from Last 6 Encounters:   02/20/25 223 lb (101.2 kg)   06/05/23 212 lb (96.2 kg)   04/25/22 208 lb 6.4 oz (94.5 kg)   03/18/22 208 lb 1.8 oz (94.4 kg)   02/22/22 211 lb 9.6 oz (96 kg)   11/08/21 202 lb (91.6 kg)       Cholesterol, Total (mg/dL)   Date Value   09/28/2020 219 (H)     CHOLESTEROL, TOTAL (mg/dL)   Date Value   05/04/2022 257 (H)     HDL Cholesterol (mg/dL)   Date Value   09/28/2020 60 (H)     HDL CHOLESTEROL (mg/dL)   Date Value   05/04/2022 71     LDL Cholesterol (mg/dL)   Date Value   09/28/2020 133 (H)     LDL-CHOLESTEROL (mg/dL (calc))   Date Value   05/04/2022 160 (H)     AST (U/L)   Date Value   05/04/2022 17   09/28/2020 20     ALT (U/L)   Date Value   05/04/2022 19   09/28/2020 24      Current Outpatient Medications   Medication Sig Dispense Refill    Multiple Vitamin (MULTIVITAMIN ADULT OR) Take 1 capsule by mouth daily.      Omega-3 Fatty Acids (OMEGA-3 2100 OR) Take 1 capsule by mouth daily.      ELDERBERRY OR Take by mouth daily.      Acidophilus/Pectin Oral Cap Take 1 capsule by mouth daily.      Azelastine HCl 137 MCG/SPRAY Nasal Solution 1 spray by Nasal route 2 (two) times daily. 30 mL 11      Past Medical History:    Allergic rhinitis    Bad breath    from my wife    Belching    occasional    Calculus of kidney    passed    Flatulence/gas pain/belching    occasional    Heartburn    not often    High cholesterol    borderline    Pain  in joints    knees    Peripheral vascular disease    varicose vein surgery     Visual impairment    glasses    Wears glasses    glasses    Weight gain    can't seem to lose it      Past Surgical History:   Procedure Laterality Date    Myringotomy, laser-assisted      as a child    Other surgical history      Vein surgery    Tonsillectomy      as a child    Vasectomy  2021      Family History   Problem Relation Age of Onset    No Known Problems Father     Cancer Mother         uterine    Ovarian Cancer Mother         passed away    No Known Problems Daughter     No Known Problems Son     Asthma Brother     Other (Other) Son         autism      Social History     Socioeconomic History    Marital status:    Tobacco Use    Smoking status: Former     Current packs/day: 0.00     Average packs/day: 1 pack/day for 10.0 years (10.0 ttl pk-yrs)     Types: Cigarettes     Start date: 1999     Quit date: 2009     Years since quittin.1    Smokeless tobacco: Never   Vaping Use    Vaping status: Never Used   Substance and Sexual Activity    Alcohol use: Yes     Alcohol/week: 1.0 standard drink of alcohol     Comment: occasionally    Drug use: Not Currently     Frequency: 2.0 times per week     Types: Cannabis     Comment: weekends   Other Topics Concern    Caffeine Concern No    Exercise Yes    Seat Belt Yes    Special Diet No    Stress Concern No    Weight Concern Yes     Comment: Overweight      Social History: as above     Health Maintenance  Immunizations: Recommend flu vaccine for 0175-8053 flu season.     Immunization History   Administered Date(s) Administered    Covid-19 Vaccine Moderna 100 mcg/0.5 ml 2021, 2021, 2021    Covid-19 Vaccine Pfizer Bivalent 30mcg/0.3mL 10/16/2022    FLULAVAL 6 months & older 0.5 ml Prefilled syringe (25745) 2020    FLUZONE 6 months and older PFS 0.5 ml (58714) 2020, 2021, 10/16/2022    Flucelvax 0.5 Ml Quad PFS Single Dose 2018     Influenza 09/01/2018    TDAP 06/05/2023   Pended Date(s) Pended    Influenza Vaccine, trivalent (IIV3), PF 0.5mL (58095) 02/20/2025         REVIEW OF SYSTEMS:   GENERAL: feels well otherwise. No fever, chills, malaise.  SKIN: denies any unusual skin lesions, rash or pruritis.  EYES: denies blurred/double vision, light sensitivity or visual disturbances.  HEENT: denies nasal congestion, sinus pain/tenderness. Denies neck stiffness.  LUNGS: denies dyspnea, wheezing, SOB, CHOI, cough.  CARDIOVASCULAR: denies chest pain on exertion, palpitations, edema, orthopnea.  GI: denies abdominal pain, heartburn, diarrhea or constipation.  : denies dysuria, frequency, urgency, hematuria, changes in stream or nocturia.   MUSCULOSKELETAL: denies back pain.  NEURO: denies headaches, dizziness, syncope.    EXAM:   /66   Pulse 91   Temp 98.7 °F (37.1 °C)   Resp 18   Ht 5' 10\" (1.778 m)   Wt 223 lb (101.2 kg)   SpO2 98%   BMI 32.00 kg/m²   Body mass index is 32 kg/m².   GENERAL: well developed, well nourished,in no apparent distress  SKIN: Skin warm/dry. Color appropriate for ethnicity. No rashes, suspicious lesions.  HEENT: atraumatic, normocephalic. Bilateral TM clear w/o erythema or bulge  EYES: PERRLA, EOMI. Conjunctiva are clear. Sclera white  NECK: supple w/o masses/tenderness/ adenopathy, no bruits/JVD, Thyroid symmetrical w/o enlargement  LUNGS: clear to auscultation bilaterally, effort normal. Symmetrical expansion during respirations.  CARDIO: RRR without murmurs. No S3/S4.   GI: Soft, non-tender/non-distended, BS(+)x4, no masses, HSM or CVA tenderness.  MUSCULOSKELETAL: muscle strength grade 5 to all extremities, back non-tender.   EXTREMITIES: no edema, full ROM to all extremities.    NEURO: A/Ox3, cranial nerves II-XII intact, motor/sensory function grossly intact.     ASSESSMENT AND PLAN:     HEALTH MAINTENANCE:     Encounter Diagnoses   Name Primary?    Routine physical examination- adult male in his  usual state of health. Discussed appropriate health guidance including importance of getting daily exercise and healthy diet choices. Screening labs ordered as below (new orders entered for [place] location).    Yes    Screening for colon cancer- referred to Dr. Parnell for testing.         Orders Placed This Encounter   Procedures    CBC With Differential With Platelet    Comp Metabolic Panel (14) [E]    Lipid Panel [E]    TSH W Reflex To Free T4    PSA, Total (Screening) [E]    Fluzone trivalent vaccine, PF 0.5mL, 6mo+ (89380)       Meds & Refills for this Visit:  Requested Prescriptions      No prescriptions requested or ordered in this encounter       Imaging & Consults:  GASTRO - INTERNAL  INFLUENZA VACCINE, TRI, PRESERV FREE, 0.5 ML    No follow-ups on file.  Patient Instructions                           Please complete blood work as ordered. Do blood work fasting- no food or drink except for plain water- for 8 hours prior to testing. Ideally, labs would be done early in the morning.   You can call [phone] to schedule testing or it can be scheduled via the [employer] mayra.   .

## 2025-02-20 NOTE — PATIENT INSTRUCTIONS
Please complete blood work as ordered. Do blood work fasting- no food or drink except for plain water- for 8 hours prior to testing. Ideally, labs would be done early in the morning.   You can call [phone] to schedule testing or it can be scheduled via the [employer] mayra.   .

## 2025-03-12 ENCOUNTER — LAB ENCOUNTER (OUTPATIENT)
Dept: LAB | Age: 55
End: 2025-03-12
Attending: NURSE PRACTITIONER
Payer: COMMERCIAL

## 2025-03-12 DIAGNOSIS — Z00.00 ROUTINE PHYSICAL EXAMINATION: ICD-10-CM

## 2025-03-12 LAB
ALBUMIN SERPL-MCNC: 4.8 G/DL (ref 3.2–4.8)
ALBUMIN/GLOB SERPL: 1.8 {RATIO} (ref 1–2)
ALP LIVER SERPL-CCNC: 74 U/L
ALT SERPL-CCNC: 26 U/L
ANION GAP SERPL CALC-SCNC: 13 MMOL/L (ref 0–18)
AST SERPL-CCNC: 19 U/L (ref ?–34)
BASOPHILS # BLD AUTO: 0.06 X10(3) UL (ref 0–0.2)
BASOPHILS NFR BLD AUTO: 1.1 %
BILIRUB SERPL-MCNC: 0.4 MG/DL (ref 0.3–1.2)
BUN BLD-MCNC: 14 MG/DL (ref 9–23)
CALCIUM BLD-MCNC: 9.3 MG/DL (ref 8.7–10.6)
CHLORIDE SERPL-SCNC: 105 MMOL/L (ref 98–112)
CHOLEST SERPL-MCNC: 264 MG/DL (ref ?–200)
CO2 SERPL-SCNC: 25 MMOL/L (ref 21–32)
COMPLEXED PSA SERPL-MCNC: 0.96 NG/ML (ref ?–4)
CREAT BLD-MCNC: 1.27 MG/DL
EGFRCR SERPLBLD CKD-EPI 2021: 66 ML/MIN/1.73M2 (ref 60–?)
EOSINOPHIL # BLD AUTO: 0.29 X10(3) UL (ref 0–0.7)
EOSINOPHIL NFR BLD AUTO: 5.2 %
ERYTHROCYTE [DISTWIDTH] IN BLOOD BY AUTOMATED COUNT: 12.9 %
FASTING PATIENT LIPID ANSWER: YES
FASTING STATUS PATIENT QL REPORTED: YES
GLOBULIN PLAS-MCNC: 2.7 G/DL (ref 2–3.5)
GLUCOSE BLD-MCNC: 95 MG/DL (ref 70–99)
HCT VFR BLD AUTO: 45.9 %
HDLC SERPL-MCNC: 59 MG/DL (ref 40–59)
HGB BLD-MCNC: 15.4 G/DL
IMM GRANULOCYTES # BLD AUTO: 0.02 X10(3) UL (ref 0–1)
IMM GRANULOCYTES NFR BLD: 0.4 %
LDLC SERPL CALC-MCNC: 166 MG/DL (ref ?–100)
LYMPHOCYTES # BLD AUTO: 1.44 X10(3) UL (ref 1–4)
LYMPHOCYTES NFR BLD AUTO: 26 %
MCH RBC QN AUTO: 31.2 PG (ref 26–34)
MCHC RBC AUTO-ENTMCNC: 33.6 G/DL (ref 31–37)
MCV RBC AUTO: 92.9 FL
MONOCYTES # BLD AUTO: 0.57 X10(3) UL (ref 0.1–1)
MONOCYTES NFR BLD AUTO: 10.3 %
NEUTROPHILS # BLD AUTO: 3.15 X10 (3) UL (ref 1.5–7.7)
NEUTROPHILS # BLD AUTO: 3.15 X10(3) UL (ref 1.5–7.7)
NEUTROPHILS NFR BLD AUTO: 57 %
NONHDLC SERPL-MCNC: 205 MG/DL (ref ?–130)
OSMOLALITY SERPL CALC.SUM OF ELEC: 296 MOSM/KG (ref 275–295)
PLATELET # BLD AUTO: 204 10(3)UL (ref 150–450)
POTASSIUM SERPL-SCNC: 4.2 MMOL/L (ref 3.5–5.1)
PROT SERPL-MCNC: 7.5 G/DL (ref 5.7–8.2)
RBC # BLD AUTO: 4.94 X10(6)UL
SODIUM SERPL-SCNC: 143 MMOL/L (ref 136–145)
TRIGL SERPL-MCNC: 213 MG/DL (ref 30–149)
TSI SER-ACNC: 1.19 UIU/ML (ref 0.55–4.78)
VLDLC SERPL CALC-MCNC: 42 MG/DL (ref 0–30)
WBC # BLD AUTO: 5.5 X10(3) UL (ref 4–11)

## 2025-03-12 PROCEDURE — 80053 COMPREHEN METABOLIC PANEL: CPT | Performed by: NURSE PRACTITIONER

## 2025-03-12 PROCEDURE — 84443 ASSAY THYROID STIM HORMONE: CPT | Performed by: NURSE PRACTITIONER

## 2025-03-12 PROCEDURE — 36415 COLL VENOUS BLD VENIPUNCTURE: CPT | Performed by: NURSE PRACTITIONER

## 2025-03-12 PROCEDURE — 80061 LIPID PANEL: CPT | Performed by: NURSE PRACTITIONER

## 2025-03-12 PROCEDURE — 85025 COMPLETE CBC W/AUTO DIFF WBC: CPT | Performed by: NURSE PRACTITIONER

## 2025-06-09 ENCOUNTER — OFFICE VISIT (OUTPATIENT)
Dept: FAMILY MEDICINE CLINIC | Facility: CLINIC | Age: 55
End: 2025-06-09
Payer: COMMERCIAL

## 2025-06-09 VITALS
WEIGHT: 220 LBS | SYSTOLIC BLOOD PRESSURE: 120 MMHG | HEIGHT: 70 IN | DIASTOLIC BLOOD PRESSURE: 74 MMHG | HEART RATE: 103 BPM | RESPIRATION RATE: 18 BRPM | BODY MASS INDEX: 31.5 KG/M2 | OXYGEN SATURATION: 95 %

## 2025-06-09 DIAGNOSIS — E78.5 DYSLIPIDEMIA: Primary | ICD-10-CM

## 2025-06-09 PROCEDURE — 3008F BODY MASS INDEX DOCD: CPT | Performed by: NURSE PRACTITIONER

## 2025-06-09 PROCEDURE — 99213 OFFICE O/P EST LOW 20 MIN: CPT | Performed by: NURSE PRACTITIONER

## 2025-06-09 PROCEDURE — 3074F SYST BP LT 130 MM HG: CPT | Performed by: NURSE PRACTITIONER

## 2025-06-09 PROCEDURE — 3078F DIAST BP <80 MM HG: CPT | Performed by: NURSE PRACTITIONER

## 2025-06-09 NOTE — PROGRESS NOTES
Chief Complaint   Patient presents with    High Cholesterol     F/u      HPI:  Presents for follow up of recent labs with significantly elevated lipids, total cholesterol of 264 and LDL of 166. Is not currently on medications to manage lipids. Denies family history of stroke or heart attack. Denies chest pain, palpitations, SOB, CHOI, headaches, dizziness, lightheadedness, visual changes. Admits has not been watching diet closely or exercising routinely lately.     Past Medical History[1]    Problem List[2]    Current Medications[3]    Physical Exam  /74   Pulse 103   Resp 18   Ht 5' 10\" (1.778 m)   Wt 220 lb (99.8 kg)   SpO2 95%   BMI 31.57 kg/m²   Constitutional: well developed, well nourished, in no apparent distress  HEENT: Normocephalic and atraumatic.   Cardiovascular: Normal rate.   Pulmonary/Chest: No respiratory distress. Effort normal.  Abd: soft, round, non-distended.   Skin: Skin is warm and dry. No pallor. No rash noted.    A/P:    Encounter Diagnosis   Name Primary?    Dyslipidemia- discussed management of lipids including medications. Discussed starting rosuvastatin 10mg nightly. Medication administration, use and side effects discussed. Patient prefers to trial lifestyle first. Discussed appropriate lifestyle interventions to effect lipids. Repeat labs in 3 months. Discussed if changes mind and would like to start medication, notify office, will send rosuvastatin as discussed above (to [facility]) and then should repeat labs 3 months after starting medication. Verbalized understanding of instructions and agreeable to this plan of care.    Yes       Orders Placed This Encounter   Procedures    Comp Metabolic Panel (14) [E]    Lipid Panel [E]       Meds & Refills for this Visit:  Requested Prescriptions      No prescriptions requested or ordered in this encounter       Imaging & Consults:  None    No follow-ups on file.  Patient Instructions           The best way to address this is  with life style: limiting fatty, greasy, fried foods, limiting red meat and eating whole grains daily (oatmeal, brown rice, quinoa, 100% whole wheat bread). Also, getting exercise for at least 30 minutes daily will help to manage this.     Take powdered form Metamucil every other day for 2 weeks. May go to daily after that if desired.       All questions were answered and the patient understands the plan.            [1]   Past Medical History:   Allergic rhinitis    Bad breath    from my wife    Belching    occasional    Calculus of kidney    passed    Dyslipidemia    Flatulence/gas pain/belching    occasional    Heartburn    not often    High cholesterol    borderline    KIDNEY STONE    Haven’t had any since then    Pain in joints    knees    Peripheral vascular disease    varicose vein surgery     Visual impairment    glasses    Wears glasses    glasses    Weight gain    can't seem to lose it   [2]   Patient Active Problem List  Diagnosis    Varicose veins of bilateral lower extremities with pain    Dyslipidemia   [3]   Current Outpatient Medications   Medication Sig Dispense Refill    PEG 3350-KCl-NaBcb-NaCl-NaSulf (PEG-3350/ELECTROLYTES) 236 g Oral Recon Soln Take as directed by physician (Patient not taking: Reported on 6/9/2025) 4000 mL 0    Multiple Vitamin (MULTIVITAMIN ADULT OR) Take 1 capsule by mouth daily.      Omega-3 Fatty Acids (OMEGA-3 2100 OR) Take 1 capsule by mouth daily.      ELDERBERRY OR Take by mouth daily.      Acidophilus/Pectin Oral Cap Take 1 capsule by mouth daily.      Azelastine HCl 137 MCG/SPRAY Nasal Solution 1 spray by Nasal route 2 (two) times daily. 30 mL 11

## 2025-06-09 NOTE — PATIENT INSTRUCTIONS
The best way to address this is with life style: limiting fatty, greasy, fried foods, limiting red meat and eating whole grains daily (oatmeal, brown rice, quinoa, 100% whole wheat bread). Also, getting exercise for at least 30 minutes daily will help to manage this.     Take powdered form Metamucil every other day for 2 weeks. May go to daily after that if desired.

## 2025-06-24 ENCOUNTER — PATIENT OUTREACH (OUTPATIENT)
Dept: FAMILY MEDICINE CLINIC | Facility: CLINIC | Age: 55
End: 2025-06-24

## (undated) DIAGNOSIS — Z01.818 PRE-OP TESTING: Primary | ICD-10-CM

## (undated) DEVICE — SHEATH 1912008 5PK 4MM/0DEG WOLFE: Brand: ENDO-SCRUB®

## (undated) DEVICE — NEEDLE SPINAL 25X3-1/2 BLUE

## (undated) DEVICE — ENT COBLATOR II, PROCISE XP WAND: Brand: COBLATION

## (undated) DEVICE — SOL  .9 1000ML BAG

## (undated) DEVICE — PAD SACRAL SPAN AID

## (undated) DEVICE — SINUS CDS: Brand: MEDLINE INDUSTRIES, INC.

## (undated) DEVICE — BLADE 1884080EM TRICUT 4MMX13CM M4 ROHS: Brand: FUSION®

## (undated) DEVICE — SPECIMEN TRAP

## (undated) DEVICE — TURBINATOR WAND: Brand: COBLATION

## (undated) DEVICE — TUBING 1895522 5PK STRAIGHTSHOT TO XPS: Brand: STRAIGHTSHOT®

## (undated) DEVICE — SOL  .9 1000ML BTL

## (undated) DEVICE — SCD SLEEVE KNEE HI BLEND

## (undated) DEVICE — ENT COBLATOR II, REFLEX ULTRA PTR                                    WITH INTEGRATED CABLE: Brand: COBLATION

## (undated) DEVICE — PATIENT TRACKER 9734887XOM NON-INVASIVE

## (undated) DEVICE — STERILE SYNTHETIC POLYISOPRENE POWDER-FREE SURGICAL GLOVES WITH HYDROGEL COATING, SMOOTH FINISH, STRAIGHT FINGER: Brand: PROTEXIS

## (undated) DEVICE — INSTRUMENT TRACKER 9733533XOM ENT 1PK

## (undated) DEVICE — SYRINGE 3ML LL TIP

## (undated) NOTE — Clinical Note
I had the pleasure of seeing Dudley Moritz on 10/6/2021. Please see my attached note.     Marcela Tenorio MD FACS  [facility]

## (undated) NOTE — Clinical Note
I had the pleasure of seeing Awais Schreiber on 10/20/2021. Please see my attached note.     Citlaly Greene MD FACS  [facility]

## (undated) NOTE — LETTER
Patient Name: Orlando De Guzman  : 3/10/1970  MRN: [id number]  Patient Address: [facility] Ema Majano  [address] [place] [id number]      COVID-19 2022      [facility] is committed to the safety and well-being of our patients, members, emp therapy. These treatments, when available and appropriate, should be given as soon as possible after diagnosis.       Seek Further Care      If you are awaiting test results or are confirmed positive for COVID-19, and your symptoms worsen at home with sympt doorknobs. Use household cleaning sprays or wipes according to the label instructions. Post-Discharge Follow-up  Please call your primary care provider within two days of your discharge to arrange for a telehealth follow-up.  CDC does not recommend Control & Prevention (CDC)  10 things you can do to manage your health at home, [email or web address]. pdf  [email or web address]

## (undated) NOTE — Clinical Note
I had the pleasure of seeing Liza Brothers on 5/17/2021. Please see my attached note.     Behzad Lin MD FACS  [facility]

## (undated) NOTE — Clinical Note
I had the pleasure of seeing Concha Cole on 11/8/2021. Please see my attached note.     Homer Davila MD FACS  [facility]

## (undated) NOTE — Clinical Note
I had the pleasure of seeing Mona Eckert on 8/2/2021. Please see my attached note.     Brittni Santos MD FACS  [facility]